# Patient Record
Sex: FEMALE | Race: WHITE | Employment: OTHER | URBAN - NONMETROPOLITAN AREA
[De-identification: names, ages, dates, MRNs, and addresses within clinical notes are randomized per-mention and may not be internally consistent; named-entity substitution may affect disease eponyms.]

---

## 2018-05-25 ENCOUNTER — TELEPHONE (OUTPATIENT)
Dept: AUDIOLOGY | Age: 73
End: 2018-05-25

## 2018-06-15 ENCOUNTER — HOSPITAL ENCOUNTER (OUTPATIENT)
Dept: AUDIOLOGY | Age: 73
Discharge: HOME | End: 2018-06-15
Payer: COMMERCIAL

## 2018-06-15 PROCEDURE — 92557 COMPREHENSIVE HEARING TEST: CPT | Performed by: AUDIOLOGIST

## 2018-06-28 ENCOUNTER — TELEPHONE (OUTPATIENT)
Dept: AUDIOLOGY | Age: 73
End: 2018-06-28

## 2018-06-28 NOTE — TELEPHONE ENCOUNTER
Received [place] [employer] prior authorization for hearing aids. Hearing aids ordered today. Patient needs appointment for new hearing aid fitting.

## 2018-07-27 ENCOUNTER — HOSPITAL ENCOUNTER (OUTPATIENT)
Dept: AUDIOLOGY | Age: 73
Discharge: HOME | End: 2018-07-27
Payer: MEDICAID

## 2018-07-27 PROCEDURE — V5261 HEARING AID, DIGIT, BIN, BTE: HCPCS | Performed by: AUDIOLOGIST

## 2018-07-27 PROCEDURE — V5160 DISPENSING FEE BINAURAL: HCPCS | Performed by: AUDIOLOGIST

## 2018-07-27 NOTE — PROGRESS NOTES
ACCOUNT #: [de-identified]  DAMON#: [id number]    DIAGNOSIS: Sensorineural hearing loss of both ears. NEW HEARING AID FITTING: Dispensed [employer] V30-P BTE hearing aids for both ears. Explained care, use and insertion. Programmed. Two week check scheduled for 8/10/18.

## 2018-08-24 ENCOUNTER — HOSPITAL ENCOUNTER (OUTPATIENT)
Dept: AUDIOLOGY | Age: 73
Discharge: HOME | End: 2018-08-24
Payer: MEDICAID

## 2018-08-24 PROCEDURE — V5266 BATTERY FOR HEARING DEVICE: HCPCS | Performed by: AUDIOLOGIST

## 2018-08-24 NOTE — PROGRESS NOTES
ACCOUNT #: [de-identified]    DIAGNOSIS:  Sensorineural hearing loss of both ears. TWO WEEK CHECK/AIDED AUDIO: The patient is doing well with the new hearing aids, except for some soreness in the helix area and tragus. Trimmed and filed both earmolds and patient immediately noted comfort. Did not complete aided testing in the soundfield due to zamudio being too small for her wheelchair. Sent follow up letter to the patient. Will see Luigi Chan annually, or sooner if problems arise. Dispensed 8 hearing aid batteries (billed to [facility]). Patient is aware that she needs to return within 90 days for earmold remake if they continue to cause soreness.

## 2018-09-21 ENCOUNTER — HOSPITAL ENCOUNTER (OUTPATIENT)
Dept: AUDIOLOGY | Age: 73
Discharge: HOME | End: 2018-09-21
Payer: MEDICAID

## 2018-09-21 PROCEDURE — V5266 BATTERY FOR HEARING DEVICE: HCPCS | Performed by: AUDIOLOGIST

## 2018-11-20 ENCOUNTER — HOSPITAL ENCOUNTER (OUTPATIENT)
Dept: AUDIOLOGY | Age: 73
Discharge: HOME | End: 2018-11-20
Payer: MEDICAID

## 2018-11-20 ENCOUNTER — TELEPHONE (OUTPATIENT)
Dept: AUDIOLOGY | Age: 73
End: 2018-11-20

## 2018-11-20 PROCEDURE — V5266 BATTERY FOR HEARING DEVICE: HCPCS

## 2018-11-20 NOTE — TELEPHONE ENCOUNTER
ACCOUNT #: [de-identified]    DIAGNOSIS:  Sensorineural hearing loss of both ears. Dispensed 8 hearing aid batteries (billed to [place]).

## 2019-10-04 ENCOUNTER — HOSPITAL ENCOUNTER (OUTPATIENT)
Dept: AUDIOLOGY | Age: 74
Discharge: HOME | End: 2019-10-04

## 2019-10-04 PROCEDURE — 9990000010 HC NO CHARGE VISIT: Performed by: AUDIOLOGIST

## 2020-06-09 ENCOUNTER — TELEPHONE (OUTPATIENT)
Dept: AUDIOLOGY | Age: 75
End: 2020-06-09

## 2020-10-29 ENCOUNTER — HOSPITAL ENCOUNTER (OUTPATIENT)
Dept: AUDIOLOGY | Age: 75
Discharge: HOME | End: 2020-10-29
Payer: MEDICAID

## 2020-10-29 PROCEDURE — 9990000010 HC NO CHARGE VISIT: Performed by: AUDIOLOGIST

## 2020-10-29 NOTE — PROGRESS NOTES
ANNUAL HEARING AID CHECK: Otoscopy was WNL for the left ear and revealed a significant piece of hard cerumen in the [facility] of the right ear. Listening check of hearing aids revealed normal output. Replaced earmold tubing on both hearing aids- the tubing was very brittle and pulling the earmolds out. Recommended debrox drops/cerumen removal for the left ear. Patient's daughter will have one of her sister's clean out the left ear. Suggested that they request a physician's order to have Mariluz's hearing tested. Scheduled six month tubing change for 4/29/2021.

## 2021-04-09 ENCOUNTER — HOSPITAL ENCOUNTER (OUTPATIENT)
Dept: AUDIOLOGY | Age: 76
Discharge: HOME | End: 2021-04-09
Payer: MEDICARE

## 2021-04-09 PROCEDURE — 92557 COMPREHENSIVE HEARING TEST: CPT | Performed by: AUDIOLOGIST

## 2021-04-09 PROCEDURE — 92592 HC HEARING AID CHECK, ONE EAR: CPT | Performed by: AUDIOLOGIST

## 2021-04-27 ENCOUNTER — HOSPITAL ENCOUNTER (OUTPATIENT)
Dept: AUDIOLOGY | Age: 76
Discharge: HOME | End: 2021-04-27

## 2021-04-27 PROCEDURE — 9990000010 HC NO CHARGE VISIT: Performed by: AUDIOLOGIST

## 2023-02-08 ENCOUNTER — OUTSIDE SERVICES (OUTPATIENT)
Dept: INTERNAL MEDICINE CLINIC | Age: 78
End: 2023-02-08

## 2023-02-08 DIAGNOSIS — N18.9 CHRONIC KIDNEY DISEASE, UNSPECIFIED CKD STAGE: ICD-10-CM

## 2023-02-08 DIAGNOSIS — G47.00 INSOMNIA, UNSPECIFIED TYPE: ICD-10-CM

## 2023-02-08 DIAGNOSIS — E03.9 HYPOTHYROIDISM, ADULT: Primary | ICD-10-CM

## 2023-02-08 DIAGNOSIS — F03.90 DEMENTIA, UNSPECIFIED DEMENTIA SEVERITY, UNSPECIFIED DEMENTIA TYPE, UNSPECIFIED WHETHER BEHAVIORAL, PSYCHOTIC, OR MOOD DISTURBANCE OR ANXIETY (HCC): ICD-10-CM

## 2023-02-08 DIAGNOSIS — L03.116 CELLULITIS OF LEFT FOOT: Primary | ICD-10-CM

## 2023-02-08 DIAGNOSIS — L03.90 CELLULITIS, UNSPECIFIED CELLULITIS SITE: ICD-10-CM

## 2023-02-08 DIAGNOSIS — E03.9 HYPOTHYROIDISM, ADULT: ICD-10-CM

## 2023-02-08 DIAGNOSIS — E11.49 OTHER DIABETIC NEUROLOGICAL COMPLICATION ASSOCIATED WITH TYPE 2 DIABETES MELLITUS (HCC): ICD-10-CM

## 2023-02-08 DIAGNOSIS — E11.40 TYPE 2 DIABETES MELLITUS WITH DIABETIC NEUROPATHY, UNSPECIFIED WHETHER LONG TERM INSULIN USE (HCC): ICD-10-CM

## 2023-02-08 DIAGNOSIS — F32.A DEPRESSION, UNSPECIFIED DEPRESSION TYPE: ICD-10-CM

## 2023-02-08 RX ORDER — DULAGLUTIDE 1.5 MG/.5ML
1.5 INJECTION, SOLUTION SUBCUTANEOUS WEEKLY
COMMUNITY

## 2023-02-08 RX ORDER — LAMOTRIGINE 100 MG/1
100 TABLET ORAL 2 TIMES DAILY
COMMUNITY

## 2023-02-08 RX ORDER — DULOXETIN HYDROCHLORIDE 30 MG/1
30 CAPSULE, DELAYED RELEASE ORAL 2 TIMES DAILY
COMMUNITY

## 2023-02-08 RX ORDER — ACETAMINOPHEN 325 MG/1
650 TABLET ORAL EVERY 6 HOURS PRN
COMMUNITY

## 2023-02-08 RX ORDER — AZELASTINE HCL 205.5 UG/1
2 SPRAY NASAL DAILY
COMMUNITY

## 2023-02-08 RX ORDER — LORATADINE 10 MG/1
10 CAPSULE, LIQUID FILLED ORAL DAILY
COMMUNITY

## 2023-02-08 RX ORDER — PANTOPRAZOLE SODIUM 40 MG/1
40 TABLET, DELAYED RELEASE ORAL DAILY
COMMUNITY

## 2023-02-08 RX ORDER — BISACODYL 10 MG
10 SUPPOSITORY, RECTAL RECTAL DAILY PRN
COMMUNITY

## 2023-02-08 RX ORDER — TRAZODONE HYDROCHLORIDE 100 MG/1
100 TABLET ORAL NIGHTLY
COMMUNITY

## 2023-02-08 RX ORDER — ACETAMINOPHEN 500 MG
500 TABLET ORAL 2 TIMES DAILY
COMMUNITY

## 2023-02-08 RX ORDER — CHOLECALCIFEROL (VITAMIN D3) 1250 MCG
CAPSULE ORAL WEEKLY
COMMUNITY

## 2023-02-08 RX ORDER — BENZONATATE 100 MG/1
100 CAPSULE ORAL 3 TIMES DAILY PRN
COMMUNITY

## 2023-02-08 RX ORDER — DIPHENHYDRAMINE HCL 25 MG
25 TABLET ORAL EVERY 8 HOURS PRN
COMMUNITY

## 2023-02-08 RX ORDER — NYSTATIN 100000 [USP'U]/G
POWDER TOPICAL 3 TIMES DAILY PRN
COMMUNITY

## 2023-02-08 RX ORDER — BUSPIRONE HYDROCHLORIDE 15 MG/1
15 TABLET ORAL 3 TIMES DAILY
COMMUNITY

## 2023-02-08 RX ORDER — POLYETHYLENE GLYCOL 3350 17 G/17G
17 POWDER, FOR SOLUTION ORAL DAILY PRN
COMMUNITY

## 2023-02-12 PROBLEM — G47.00 INSOMNIA: Status: ACTIVE | Noted: 2023-02-12

## 2023-02-12 PROBLEM — F03.90 DEMENTIA (HCC): Status: ACTIVE | Noted: 2023-02-12

## 2023-02-12 PROBLEM — F32.A DEPRESSION: Status: ACTIVE | Noted: 2023-02-12

## 2023-02-12 NOTE — PROGRESS NOTES
Date of Admission: 2/3/2023  Date of Encounter: 2/8/2023  Patient:  Michael Combs  YOB: 1945      Chief Complaint: Admission after treatment in the hospital for cellulitis    History of Present Illness:  She was treated in the hospital for cellulitis in the lower extremity. Appears to be better now with antibiotics. She denies fever or chills. Denies pain in her leg. Leg still erythematous, but she says it is much better than before. Past Medical History: Hypothyroidism, depression, type 2 diabetes  Past Family History: Noncontributory  Social History: Non-smoker, nondrinker at this time    Review of Systems:  Constitutional: Denies fever, chills  Cardiac: Denies chest pain or palpitations  Except as dictated above, all other systems reviewed and are negative     Physical Exam  Vitals: Blood pressure 108/56. Pulse 78. Respirations 16. Temperature 98.3  Constitutional: No acute distress  Eyes: No lid lag or proptosis. PERRLA  HENT: External ears normal. No lesions on the lips  Respiratory: Good air entry bilaterally. No wheezing or crackles  Cardiovascular: Normal S1-S2. Lower extremity edema  Gastrointestinal: No visible veins or masses. Good bowel sounds  Skin: erythema and swelling in the lower extremity   Neurologic: Cranial nerves grossly intact. Sensation grossly intact  Psychiatric: Alert and oriented. Normal Affect. Assessments   Diagnosis Orders   1. Hypothyroidism, adult        2. Type 2 diabetes mellitus with diabetic neuropathy, unspecified whether long term insulin use ([address]        3. Insomnia, unspecified type        4. Depression, unspecified depression type        5. Dementia, unspecified dementia severity, unspecified dementia type, unspecified whether behavioral, psychotic, or mood disturbance or anxiety ([address]        6. Cellulitis, unspecified cellulitis site            Plan  We will continue current medications unless otherwise noted.   I will be notified of any changes to the patient's condition. This note has been created using the [facility] health record, and dictated in part by [employer] dictation system. Despite the documenting physician's best efforts, there may be errors in spelling, grammar or syntax.

## 2023-02-17 ENCOUNTER — OUTSIDE SERVICES (OUTPATIENT)
Dept: FAMILY MEDICINE CLINIC | Age: 78
End: 2023-02-17
Payer: MEDICARE

## 2023-02-17 DIAGNOSIS — E03.9 HYPOTHYROIDISM, UNSPECIFIED TYPE: ICD-10-CM

## 2023-02-17 DIAGNOSIS — L98.1 DERMATITIS FACTITIA: ICD-10-CM

## 2023-02-17 DIAGNOSIS — F03.90 DEMENTIA, UNSPECIFIED DEMENTIA SEVERITY, UNSPECIFIED DEMENTIA TYPE, UNSPECIFIED WHETHER BEHAVIORAL, PSYCHOTIC, OR MOOD DISTURBANCE OR ANXIETY (HCC): ICD-10-CM

## 2023-02-17 DIAGNOSIS — F32.9 MAJOR DEPRESSIVE DISORDER, REMISSION STATUS UNSPECIFIED, UNSPECIFIED WHETHER RECURRENT: ICD-10-CM

## 2023-02-17 DIAGNOSIS — L03.116 CELLULITIS OF LEFT LEG: Primary | ICD-10-CM

## 2023-02-17 DIAGNOSIS — Z86.14 HISTORY OF MRSA INFECTION: ICD-10-CM

## 2023-02-17 DIAGNOSIS — G47.00 INSOMNIA, UNSPECIFIED TYPE: ICD-10-CM

## 2023-02-17 DIAGNOSIS — E55.9 VITAMIN D DEFICIENCY: ICD-10-CM

## 2023-02-17 DIAGNOSIS — N18.9 CHRONIC KIDNEY DISEASE, UNSPECIFIED CKD STAGE: ICD-10-CM

## 2023-02-17 DIAGNOSIS — E78.5 HYPERLIPIDEMIA, UNSPECIFIED HYPERLIPIDEMIA TYPE: ICD-10-CM

## 2023-02-17 PROCEDURE — 99309 SBSQ NF CARE MODERATE MDM 30: CPT | Performed by: FAMILY MEDICINE

## 2023-03-09 LAB
ANION GAP SERPL CALCULATED.3IONS-SCNC: 4.8 MMOL/L
BUN BLDV-MCNC: 20 MG/DL (ref 7–17)
CALCIUM SERPL-MCNC: 8.6 MG/DL (ref 8.4–10.2)
CHLORIDE BLD-SCNC: 100 MMOL/L (ref 98–120)
CO2: 39 MMOL/L (ref 22–31)
CREAT SERPL-MCNC: 1 MG/DL (ref 0.5–1)
GFR CALCULATED: 57.1
GLUCOSE: 129 MG/DL (ref 65–105)
POTASSIUM SERPL-SCNC: 3.8 MMOL/L (ref 3.6–5)
SODIUM BLD-SCNC: 140 MMOL/L (ref 135–145)

## 2023-03-14 ENCOUNTER — OUTSIDE SERVICES (OUTPATIENT)
Dept: INTERNAL MEDICINE | Age: 78
End: 2023-03-14
Payer: MEDICARE

## 2023-03-14 DIAGNOSIS — G47.00 INSOMNIA, UNSPECIFIED TYPE: ICD-10-CM

## 2023-03-14 DIAGNOSIS — F03.90 DEMENTIA, UNSPECIFIED DEMENTIA SEVERITY, UNSPECIFIED DEMENTIA TYPE, UNSPECIFIED WHETHER BEHAVIORAL, PSYCHOTIC, OR MOOD DISTURBANCE OR ANXIETY (HCC): ICD-10-CM

## 2023-03-14 DIAGNOSIS — E11.40 TYPE 2 DIABETES MELLITUS WITH DIABETIC NEUROPATHY, UNSPECIFIED WHETHER LONG TERM INSULIN USE (HCC): ICD-10-CM

## 2023-03-14 DIAGNOSIS — F32.A DEPRESSION, UNSPECIFIED DEPRESSION TYPE: ICD-10-CM

## 2023-03-14 DIAGNOSIS — E03.9 HYPOTHYROIDISM, ADULT: Primary | ICD-10-CM

## 2023-03-14 DIAGNOSIS — L03.90 CELLULITIS, UNSPECIFIED CELLULITIS SITE: ICD-10-CM

## 2023-03-14 PROCEDURE — 99308 SBSQ NF CARE LOW MDM 20: CPT | Performed by: NURSE PRACTITIONER

## 2023-03-15 NOTE — PROGRESS NOTES
[facility]  Date of Service: 03/14/23  Sherwin Stearns  Date of Birth 1945  Code Status----FULL CODE       HPI:  This is a female patient of Dr. David Smith in [facility], who presents on transfer from the [facility]. Patient was recently hospitalized due to cellulitis around the left knee. She has a dermatitis type tissue diagnosis and is a chronic quote . Treated with IV antibiotics with improvement. Chronic history reviewed. Her infection seemed to have improved. She still has a Band-Aid over one of the lesions on the left knee. She endorses chronic peripheral edema. Currently, the cellulitis is improved. She is reportedly somewhat limited on weight bearing due to right knee pain which is also chronic. In the interval  Patient being seen for routine monthly rounds. She was [place] [facility] [place] [facility] at [facility] where she was admitted 1/27/2023 through 2/3/2023 for cellulitis of lower extremity. Resident legs significantly improved. Patient was previously at [address] she wants to get back home but understands that her family does not feel that is safe for her. Her daughter does work at [facility] in patient is happy that she gets to see her more frequently. She denies any shortness of breath no chest discomfort. Does ambulate with 1 assist and a rolling walker per the nurses aide. No recent falls while at [facility]. States she has a good appetite. Denies any problems with bowel or bladder. Vitals have been stable. Nursing staff deny any acute nursing service issues.            Immunization History   Administered Date(s) Administered    COVID-19, MODERNA BLUE border, Primary or Immunocompromised, (age 12y+), IM, 100 mcg/0.5mL 01/30/2021, 02/09/2021    Influenza Virus Vaccine 10/30/2008    Influenza, FLUAD, (age 72 y+), Adjuvanted, 0.5mL 02/10/2023    Influenza, FLUARIX, FLULAVAL, FLUZONE (age 10 mo+) AND AFLURIA, (age 1 y+), PF, 0.5mL 11/13/2017, 11/29/2018, 10/24/2019, 09/17/2020    Pneumococcal Conjugate 13-valent [place]) 11/03/2015    Tdap (Boostrix, Adacel) 10/02/2009       Allergies   Allergen Reactions    Dust Mite Extract     Molds & Smuts     Pollen Extract     Aspirin     Adhesive Tape        Past Medical History:   Diagnosis Date    Acid reflux     Asthma     Cellulitis of left foot     Chronic kidney disease, unspecified     Diabetes mellitus ([facility]     Diabetic neuropathy ([facility]     Hyperlipidemia     Hypothyroidism, adult     Thyroid disease        No past surgical history on file.     Social History     Socioeconomic History    Marital status: Single     Spouse name: Not on file    Number of children: Not on file    Years of education: Not on file    Highest education level: Not on file   Occupational History    Not on file   Tobacco Use    Smoking status: Not on file    Smokeless tobacco: Not on file   Substance and Sexual Activity    Alcohol use: Not on file    Drug use: Not on file    Sexual activity: Not on file   Other Topics Concern    Not on file   Social History Narrative    Not on file     Social Determinants of Health     Financial Resource Strain: Not on file   Food Insecurity: Not on file   Transportation Needs: Not on file   Physical Activity: Not on file   Stress: Not on file   Social Connections: Not on file   Intimate Partner Violence: Not on file   Housing Stability: Not on file       Current Outpatient Medications on File Prior to Visit   Medication Sig Dispense Refill    linagliptin (TRADJENTA) 5 MG tablet Take 5 mg by mouth daily      ASPERCREME LIDOCAINE EX Apply topically every 8 hours as needed Bilateral knees      azelastine HCl 0.15 % SOLN 2 sprays by Nasal route daily      benzonatate (TESSALON) 100 MG capsule Take 100 mg by mouth 3 times daily as needed for Cough      busPIRone (BUSPAR) 15 MG tablet Take 15 mg by mouth 3 times daily      carbamide peroxide (DEBROX) 6.5 % otic solution 3 drops daily as needed      diphenhydrAMINE (BENADRYL) 25 MG tablet Take 25 mg by mouth every 8 hours as needed for Allergies      bisacodyl (DULCOLAX) 10 MG suppository Place 10 mg rectally daily as needed for Constipation      DULoxetine (CYMBALTA) 30 MG extended release capsule Take 30 mg by mouth 2 times daily      Cholecalciferol (VITAMIN D3) 1.25 MG (81286 UT) CAPS Take by mouth once a week On Friday      lamoTRIgine (LAMICTAL) 100 MG tablet Take 100 mg by mouth 2 times daily      loratadine (CLARITIN) 10 MG capsule Take 10 mg by mouth daily      magnesium hydroxide (MILK OF MAGNESIA) 400 MG/5ML suspension Take 30 mLs by mouth daily as needed for Constipation      pantoprazole (PROTONIX) 40 MG tablet Take 40 mg by mouth daily      polyethylene glycol (GLYCOLAX) 17 g packet Take 17 g by mouth daily as needed for Constipation      traZODone (DESYREL) 100 MG tablet Take 100 mg by mouth nightly      dulaglutide (TRULICITY) 1.5 YR/8.7OL SC injection Inject 1.5 mg into the skin once a week Fridays      acetaminophen (TYLENOL) 500 MG tablet Take 500 mg by mouth 2 times daily      glucagon, rDNA, 1 MG injection as needed for Low blood sugar      Dextrose, Diabetic Use, (GLUCOSE) 15 GM/33GM GEL Take by mouth as needed      docusate sodium (COLACE) 100 MG capsule Take 1 capsule by mouth 2 times daily. 100 capsule 0    furosemide (LASIX) 20 MG tablet Take 20 mg by mouth 2 times daily. potassium chloride (MICRO-K) 10 MEQ CR capsule Take 10 mEq by mouth daily      levothyroxine (SYNTHROID) 150 MCG tablet Take 150 mcg by mouth Daily. metformin (GLUCOPHAGE) 500 MG tablet Take 500 mg by mouth 2 times daily (with meals). ferrous sulfate 325 (65 FE) MG EC tablet Take 325 mg by mouth 3 times daily (with meals). atorvastatin (LIPITOR) 10 MG tablet Take 10 mg by mouth daily.       hydrOXYzine (ATARAX) 25 MG tablet Take 25 mg by mouth every 8 hours as needed      fluticasone-salmeterol (ADVAIR) 250-50 MCG/DOSE AEPB Inhale 1 puff into the lungs 2 times daily. gabapentin (NEURONTIN) 100 MG capsule Take 100 mg by mouth 4 times daily. baclofen (LIORESAL) 5 mg TABS Take 10 mg by mouth daily       No current facility-administered medications on file prior to visit. Review of Systems   Constitutional:  Positive for activity change. Negative for appetite change, chills, fatigue, fever and unexpected weight change. HENT:  Negative for congestion, dental problem, ear discharge, ear pain, facial swelling, hearing loss, postnasal drip, rhinorrhea, sinus pressure, sore throat and trouble swallowing. Eyes:  Negative for pain and visual disturbance. Respiratory:  Negative for cough, chest tightness, shortness of breath and wheezing. Cardiovascular:  Positive for leg swelling (Chronic, stable). Negative for chest pain and palpitations. Gastrointestinal:  Negative for abdominal pain, blood in stool, constipation, diarrhea, nausea and vomiting. Endocrine: Negative for cold intolerance, heat intolerance and polyuria. Genitourinary:  Negative for difficulty urinating. Musculoskeletal:  Positive for arthralgias (Stable). Negative for gait problem, myalgias, neck pain and neck stiffness. Skin:  Negative for color change, rash and wound. Neurological:  Positive for weakness. Negative for dizziness, tremors, seizures, light-headedness, numbness and headaches. Forgetfulness   Psychiatric/Behavioral:  Negative for confusion and hallucinations. The patient is not nervous/anxious. Physical Exam  Vitals and nursing note reviewed. Constitutional:       General: She is not in acute distress. Appearance: Normal appearance. She is well-developed. She is not diaphoretic. HENT:      Head: Normocephalic and atraumatic. Right Ear: External ear normal.      Left Ear: External ear normal.   Eyes:      General:         Right eye: No discharge. Left eye: No discharge.    Neck: Trachea: No tracheal deviation. Cardiovascular:      Rate and Rhythm: Normal rate and regular rhythm. Heart sounds: Normal heart sounds. No murmur heard. No friction rub. No gallop. Pulmonary:      Effort: Pulmonary effort is normal. No respiratory distress. Breath sounds: Normal breath sounds. No stridor. No wheezing, rhonchi or rales. Chest:      Chest wall: No tenderness. Abdominal:      General: Bowel sounds are normal. There is no distension. Palpations: Abdomen is soft. Tenderness: There is no abdominal tenderness. Musculoskeletal:         General: No swelling. Right lower leg: Edema present. Left lower leg: Edema (+1 edema bilateral lower extremities, chronic discoloration mild erythremia posterior to bilateral left greater than right) present. Skin:     General: Skin is warm and dry. Capillary Refill: Capillary refill takes less than 2 seconds. Coloration: Skin is not jaundiced or pale. Findings: No rash. Neurological:      General: No focal deficit present. Mental Status: She is alert and oriented to person, place, and time. Cranial Nerves: No cranial nerve deficit. Sensory: No sensory deficit. Coordination: Coordination normal.   Psychiatric:         Mood and Affect: Mood normal.         Behavior: Behavior normal.         Thought Content: Thought content normal.         Judgment: Judgment normal.       Vital signs: Temperature: 97.3 °F, blood pressure 131/73, pulse 73, respirations 18, SPO2 98% on room air, weight 244.6 pounds    ASSESSMENT/PLAN:  Cellulitis of left lower extremity. She likely self-picked some lesions around the knee starting the infection. Blood cultures were negative. Symptoms resolved quickly with IV antibiotics and are close to being fully healed. She has her fingernails cut short.  She is aware of her problem with picking holes in her skin and is actively trying not to damage her skin with repetitive scratching. Hypothyroidism. No updated labs available. Plan to continue her Synthroid dosing at 150 micrograms per day. Follow up with serial labs if she becomes a long term patient. History of dementia. We do not have any current history. She is on no active medications for dementia but is being treated actively for anxiety and depression. History of insomnia. Patient has hydroxyzine and Benadryl as needed. No current complaints for every issue sleeping. History of major depression. She is currently on duloxetine as well as lamotrigine. No current concerns. No concerns for behavior issues. Will observe on her chronic medication dosing as above. History of GERD. Fairly quiescent at present on Protonix once daily. History of asthma. She is on an inhaler fluticasone salmeterol once daily fairly quiescent. Currently I hear more crackles which gives consideration for pulmonary fibrosis. She denies ever being a smoker. CKD. No recent labs. Will follow this serially and avoid nephrotoxins when able. Diabetes with neuropathy. Patient is currently on Tradjenta and Trulicity. No concerns for hypoglycemia. Will likely update labs with follow up is she is long term. Hyperlipidemia. Plan to continue atorvastatin 10 milligrams a day with regular checks. Dermatitis factitial. She likely has this as a source for her recent knee infection. Encouraged keeping her nails short and keeping the arms covered and do what she can to not excoriate her skin. History of incontinence and urgency. No active medication treatment at this time. History of MRSA. No evidence of recurrent infections. Current infection did not involve MRSA. History of osteoarthritis multiple joints. Currently, she is getting right knee pain that is limiting her ambulation. Offered an injection trial out here at the facility if she is struggling with persistent pain. Prior history of pulmonary embolism. Noted in her records.  No significant anticoagulation. She did have updated DVT scanning of the legs which was negative during this last admission. Vitamin D deficiency. Implied from her vitamin D replacement at 50,000 units weekly. Plan to continue same. Question of a chronic cough. I see that she is on benzonatate 100 milligrams three times a day likely for chronic cough. She has some inspiratory crackles in the posterior bases suspicious for pulmonary fibrosis. Currently, without any hypoxia but her room air oxygen saturation is at the lower end of normal at 92%. Observation at present. Remainder of chronic health conditions stableand unchanged  Plan as noted above- will follow up for routine monthly rounds. They will call sooner with any concerns prior.      Electronically signed by RONAK Jennings CNP on 3/14/2023 at 9:55 PM

## 2023-03-15 NOTE — PROGRESS NOTES
[facility]  Date of Service:  2/17/2023  Kamilah Cardenas  Date of Birth 1945  MRN: <[id number]>  Code Status----FULL CODE    Admission. HPI:  This is a female patient of Dr. Todd Erazo in [facility], who presents on transfer from the [facility]. Patient was recently hospitalized due to cellulitis around the left knee. She has a dermatitis type tissue diagnosis and is a chronic quote . Treated with IV antibiotics with improvement. Chronic history reviewed. Her infection seemed to have improved. She still has a Band-Aid over one of the lesions on the left knee. She endorses chronic peripheral edema. Currently, the cellulitis is improved. She is reportedly somewhat limited on weight bearing due to right knee pain which is also chronic. Immunization History   Administered Date(s) Administered    COVID-19, MODERNA BLUE border, Primary or Immunocompromised, (age 12y+), IM, 100 mcg/0.5mL 01/30/2021, 02/09/2021    Influenza Virus Vaccine 10/30/2008    Influenza, FLUAD, (age 72 y+), Adjuvanted, 0.5mL 02/10/2023    Influenza, FLUARIX, FLULAVAL, FLUZONE (age 10 mo+) AND AFLURIA, (age 1 y+), PF, 0.5mL 11/13/2017, 11/29/2018, 10/24/2019, 09/17/2020    Pneumococcal Conjugate 13-valent [place] Innocent) 11/03/2015    Tdap (Boostrix, Adacel) 10/02/2009       Allergies   Allergen Reactions    Dust Mite Extract     Molds & Smuts     Pollen Extract     Aspirin     Adhesive Tape        Past Medical History:   Diagnosis Date    Acid reflux     Asthma     Cellulitis of left foot     Chronic kidney disease, unspecified     Diabetes mellitus ([address]     Diabetic neuropathy ([address]     Hyperlipidemia     Hypothyroidism, adult     Thyroid disease        No past surgical history on file.     Social History     Socioeconomic History    Marital status: Single     Spouse name: Not on file    Number of children: Not on file    Years of education: Not on file    Highest education level: Not on file Occupational History    Not on file   Tobacco Use    Smoking status: Not on file    Smokeless tobacco: Not on file   Substance and Sexual Activity    Alcohol use: Not on file    Drug use: Not on file    Sexual activity: Not on file   Other Topics Concern    Not on file   Social History Narrative    Not on file     Social Determinants of Health     Financial Resource Strain: Not on file   Food Insecurity: Not on file   Transportation Needs: Not on file   Physical Activity: Not on file   Stress: Not on file   Social Connections: Not on file   Intimate Partner Violence: Not on file   Housing Stability: Not on file       Current Outpatient Medications   Medication Sig Dispense Refill    linagliptin (TRADJENTA) 5 MG tablet Take 5 mg by mouth daily      ASPERCREME LIDOCAINE EX Apply topically every 8 hours as needed Bilateral knees      azelastine HCl 0.15 % SOLN 2 sprays by Nasal route daily      benzonatate (TESSALON) 100 MG capsule Take 100 mg by mouth 3 times daily as needed for Cough      busPIRone (BUSPAR) 15 MG tablet Take 15 mg by mouth 3 times daily      carbamide peroxide (DEBROX) 6.5 % otic solution 3 drops daily as needed      diphenhydrAMINE (BENADRYL) 25 MG tablet Take 25 mg by mouth every 8 hours as needed for Allergies      bisacodyl (DULCOLAX) 10 MG suppository Place 10 mg rectally daily as needed for Constipation      DULoxetine (CYMBALTA) 30 MG extended release capsule Take 30 mg by mouth 2 times daily      Cholecalciferol (VITAMIN D3) 1.25 MG (62086 UT) CAPS Take by mouth once a week On Friday      lamoTRIgine (LAMICTAL) 100 MG tablet Take 100 mg by mouth 2 times daily      loratadine (CLARITIN) 10 MG capsule Take 10 mg by mouth daily      magnesium hydroxide (MILK OF MAGNESIA) 400 MG/5ML suspension Take 30 mLs by mouth daily as needed for Constipation      pantoprazole (PROTONIX) 40 MG tablet Take 40 mg by mouth daily      polyethylene glycol (GLYCOLAX) 17 g packet Take 17 g by mouth daily as needed for Constipation      traZODone (DESYREL) 100 MG tablet Take 100 mg by mouth nightly      dulaglutide (TRULICITY) 1.5 WL/8.3WY SC injection Inject 1.5 mg into the skin once a week Fridays      acetaminophen (TYLENOL) 500 MG tablet Take 500 mg by mouth 2 times daily      glucagon, rDNA, 1 MG injection as needed for Low blood sugar      Dextrose, Diabetic Use, (GLUCOSE) 15 GM/33GM GEL Take by mouth as needed      docusate sodium (COLACE) 100 MG capsule Take 1 capsule by mouth 2 times daily. 100 capsule 0    furosemide (LASIX) 20 MG tablet Take 20 mg by mouth 2 times daily. potassium chloride (MICRO-K) 10 MEQ CR capsule Take 10 mEq by mouth daily      levothyroxine (SYNTHROID) 150 MCG tablet Take 150 mcg by mouth Daily. metformin (GLUCOPHAGE) 500 MG tablet Take 500 mg by mouth 2 times daily (with meals). ferrous sulfate 325 (65 FE) MG EC tablet Take 325 mg by mouth 3 times daily (with meals). atorvastatin (LIPITOR) 10 MG tablet Take 10 mg by mouth daily. hydrOXYzine (ATARAX) 25 MG tablet Take 25 mg by mouth every 8 hours as needed      fluticasone-salmeterol (ADVAIR) 250-50 MCG/DOSE AEPB Inhale 1 puff into the lungs 2 times daily. gabapentin (NEURONTIN) 100 MG capsule Take 100 mg by mouth 4 times daily. baclofen (LIORESAL) 5 mg TABS Take 10 mg by mouth daily       No current facility-administered medications for this visit. REVIEW OF SYSTEMS:  Recent cellulitis much improved. Knee pain more on the right-side effects ambulation at present. Chronic obesity compromising her ambulation as well. Denies any significant depression. Unaware of her blood sugar control. Some mild incontinence and urgency of urination. Review systems otherwise negative. PHYSICAL EXAM:  Vital Signs:  Weight 255.7 pounds (she appears to be up about 6-7 pounds from admission weight). Temperature 97.2. Pulse 75. Respiratory rate 18. Blood pressure 129/87. SPO2 92% on room air.     Constitutional: Pleasant, age appropriate female sitting in a wheelchair. She is well dressed and well groomed. No apparent distress. Cardiovascular:   Regular rate and rhythm. Pulmonary:   Lungs have some inspiratory and expiratory crackles heard in the posterior bases. She reports intermittent cough long term. Abdominal:  Obese. She had a couple skin lesions over the waistline that are now well healed. No palpable masses. Active bowel sounds. Musculoskeletal:      Lower extremities have 2 to 3+ edema. Skin:  Some warmth and erythema still over the lower anterior shin area but no erosion of the skin. No drainage. Neurological:  Alert. Oriented to person and place. She is a fairly good historian at present. No behavioral issues. ASSESSMENT/PLAN:  Cellulitis of left lower extremity. She likely self-picked some lesions around the knee starting the infection. Blood cultures were negative. Symptoms resolved quickly with IV antibiotics and are close to being fully healed. She has her fingernails cut short. She is aware of her problem with picking holes in her skin and is actively trying not to damage her skin with repetitive scratching. Hypothyroidism. No updated labs available. Plan to continue her Synthroid dosing at 150 micrograms per day. Follow up with serial labs if she becomes a long term patient. History of dementia. We do not have any current history. She is on no active medications for dementia but is being treated actively for anxiety and depression. History of insomnia. Patient has hydroxyzine and Benadryl as needed. No current complaints for every issue sleeping. History of major depression. She is currently on duloxetine as well as lamotrigine. No current concerns. No concerns for behavior issues. Will observe on her chronic medication dosing as above. History of GERD. Fairly quiescent at present on Protonix once daily. History of asthma.  She is on an inhaler fluticasone salmeterol once daily fairly quiescent. Currently I hear more crackles which gives consideration for pulmonary fibrosis. She denies ever being a smoker. CKD. No recent labs. Will follow this serially and avoid nephrotoxins when able. Diabetes with neuropathy. Patient is currently on Tradjenta and Trulicity. No concerns for hypoglycemia. Will likely update labs with follow up is she is long term. Hyperlipidemia. Plan to continue atorvastatin 10 milligrams a day with regular checks. Dermatitis factitial. She likely has this as a source for her recent knee infection. Encouraged keeping her nails short and keeping the arms covered and do what she can to not excoriate her skin. History of incontinence and urgency. No active medication treatment at this time. History of MRSA. No evidence of recurrent infections. Current infection did not involve MRSA. History of osteoarthritis multiple joints. Currently, she is getting right knee pain that is limiting her ambulation. Offered an injection trial out here at the facility if she is struggling with persistent pain. Prior history of pulmonary embolism. Noted in her records. No significant anticoagulation. She did have updated DVT scanning of the legs which was negative during this last admission. Vitamin D deficiency. Implied from her vitamin D replacement at 50,000 units weekly. Plan to continue same. Question of a chronic cough. I see that she is on benzonatate 100 milligrams three times a day likely for chronic cough. She has some inspiratory crackles in the posterior bases suspicious for pulmonary fibrosis. Currently, without any hypoxia but her room air oxygen saturation is at the lower end of normal at 92%. Observation at present. Remainder of chronic health conditions stable and unchanged. Plan as noted above. Will follow up for routine monthly rounds. They will call sooner with any concerns prior.          I, Sheila Cleaning, am personally transcribing for Alvaro Baxter MD 3/15/23 at 11:20 AM EDT. Kristyn Singh MD, personally performed the services described in this document as transcribed by the , and it is both accurate and complete.     Electronically signed by Alvaro Baxter MD on 3/16/23 at 2:25 PM EDT

## 2023-03-16 ASSESSMENT — ENCOUNTER SYMPTOMS
TROUBLE SWALLOWING: 0
SORE THROAT: 0
WHEEZING: 0
SHORTNESS OF BREATH: 0
CONSTIPATION: 0
FACIAL SWELLING: 0
SINUS PRESSURE: 0
RHINORRHEA: 0
NAUSEA: 0
COUGH: 0
BLOOD IN STOOL: 0
ABDOMINAL PAIN: 0
EYE PAIN: 0
VOMITING: 0
DIARRHEA: 0
CHEST TIGHTNESS: 0
COLOR CHANGE: 0

## 2023-04-18 ENCOUNTER — TELEPHONE (OUTPATIENT)
Dept: FAMILY MEDICINE CLINIC | Age: 78
End: 2023-04-18
Payer: MEDICARE

## 2023-04-18 DIAGNOSIS — E11.22 TYPE 2 DIABETES MELLITUS WITH ESRD (END-STAGE RENAL DISEASE) (HCC): ICD-10-CM

## 2023-04-18 DIAGNOSIS — M15.0 PRIMARY GENERALIZED HYPERTROPHIC OSTEOARTHROSIS: Primary | ICD-10-CM

## 2023-04-18 DIAGNOSIS — E13.42 DIABETIC POLYNEUROPATHY ASSOCIATED WITH OTHER SPECIFIED DIABETES MELLITUS (HCC): ICD-10-CM

## 2023-04-18 DIAGNOSIS — F02.84: ICD-10-CM

## 2023-04-18 DIAGNOSIS — G31.09: ICD-10-CM

## 2023-04-18 DIAGNOSIS — G30.1 DEMENTIA OF THE ALZHEIMER'S TYPE, WITH LATE ONSET, WITH DEPRESSED MOOD (HCC): ICD-10-CM

## 2023-04-18 DIAGNOSIS — N18.6 TYPE 2 DIABETES MELLITUS WITH ESRD (END-STAGE RENAL DISEASE) (HCC): ICD-10-CM

## 2023-04-18 DIAGNOSIS — I50.9 HEART FAILURE, UNSPECIFIED HF CHRONICITY, UNSPECIFIED HEART FAILURE TYPE (HCC): ICD-10-CM

## 2023-04-18 DIAGNOSIS — G47.00 INSOMNIA, UNSPECIFIED TYPE: ICD-10-CM

## 2023-04-18 DIAGNOSIS — F32.9 MAJOR DEPRESSIVE DISORDER WITH SINGLE EPISODE, REMISSION STATUS UNSPECIFIED: ICD-10-CM

## 2023-04-18 DIAGNOSIS — E03.9 PRIMARY HYPOTHYROIDISM: ICD-10-CM

## 2023-04-18 DIAGNOSIS — N18.4 CHRONIC KIDNEY DISEASE, STAGE IV (SEVERE) (HCC): ICD-10-CM

## 2023-04-18 DIAGNOSIS — F42.4 SELF-EXCORIATION DISORDER: ICD-10-CM

## 2023-04-18 DIAGNOSIS — F02.83 DEMENTIA OF THE ALZHEIMER'S TYPE, WITH LATE ONSET, WITH DEPRESSED MOOD (HCC): ICD-10-CM

## 2023-04-18 DIAGNOSIS — L98.1 DERMATITIS FACTITIA: ICD-10-CM

## 2023-04-18 PROCEDURE — G0180 MD CERTIFICATION HHA PATIENT: HCPCS | Performed by: FAMILY MEDICINE

## 2023-04-18 NOTE — TELEPHONE ENCOUNTER
Home health plan of care reviewed and certification completed on 04/18/23 on patient for service dates 04/06/23-06/04/23. Medications reviewed and verified.   Physician time spent on activities to coordinate services, documenting medical decision making, reviewing of reports, treatment plans and test results is 20 minutes

## 2023-11-21 ENCOUNTER — HOSPITAL ENCOUNTER (EMERGENCY)
Age: 78
Discharge: HOME | End: 2023-11-21
Attending: EMERGENCY MEDICINE
Payer: MEDICARE

## 2023-11-21 ENCOUNTER — APPOINTMENT (OUTPATIENT)
Dept: CT IMAGING | Age: 78
End: 2023-11-21
Payer: MEDICARE

## 2023-11-21 VITALS
TEMPERATURE: 97.6 F | WEIGHT: 285 LBS | DIASTOLIC BLOOD PRESSURE: 72 MMHG | OXYGEN SATURATION: 96 % | SYSTOLIC BLOOD PRESSURE: 143 MMHG | RESPIRATION RATE: 16 BRPM | HEART RATE: 80 BPM

## 2023-11-21 DIAGNOSIS — K62.5 HEMORRHAGE OF RECTUM AND ANUS: Primary | ICD-10-CM

## 2023-11-21 LAB
ALBUMIN SERPL-MCNC: 3.7 G/DL (ref 3.5–5.2)
ALBUMIN/GLOB SERPL: 1.3 {RATIO} (ref 1–2.5)
ALP SERPL-CCNC: 55 U/L (ref 35–104)
ALT SERPL-CCNC: 7 U/L (ref 5–33)
ANION GAP SERPL CALCULATED.3IONS-SCNC: 6 MMOL/L (ref 9–17)
AST SERPL-CCNC: 10 U/L
BASOPHILS # BLD: 0.03 K/UL (ref 0–0.2)
BASOPHILS NFR BLD: 1 % (ref 0–2)
BILIRUB SERPL-MCNC: 0.5 MG/DL (ref 0.3–1.2)
BUN SERPL-MCNC: 22 MG/DL (ref 8–23)
BUN/CREAT SERPL: 22 (ref 9–20)
CALCIUM SERPL-MCNC: 8.9 MG/DL (ref 8.6–10.4)
CHLORIDE SERPL-SCNC: 98 MMOL/L (ref 98–107)
CO2 SERPL-SCNC: 36 MMOL/L (ref 20–31)
CREAT SERPL-MCNC: 1 MG/DL (ref 0.5–0.9)
EOSINOPHIL # BLD: 0.3 K/UL (ref 0–0.44)
EOSINOPHILS RELATIVE PERCENT: 5 % (ref 1–4)
ERYTHROCYTE [DISTWIDTH] IN BLOOD BY AUTOMATED COUNT: 13.6 % (ref 11.8–14.4)
GFR SERPL CREATININE-BSD FRML MDRD: 58 ML/MIN/1.73M2
GLUCOSE SERPL-MCNC: 223 MG/DL (ref 70–99)
HCT VFR BLD AUTO: 41.6 % (ref 36.3–47.1)
HGB BLD-MCNC: 13.2 G/DL (ref 11.9–15.1)
IMM GRANULOCYTES # BLD AUTO: 0.04 K/UL (ref 0–0.3)
IMM GRANULOCYTES NFR BLD: 1 %
INR PPP: 1
LACTATE BLDV-SCNC: 2.2 MMOL/L (ref 0.5–2.2)
LIPASE SERPL-CCNC: 17 U/L (ref 13–60)
LYMPHOCYTES NFR BLD: 2.12 K/UL (ref 1.1–3.7)
LYMPHOCYTES RELATIVE PERCENT: 33 % (ref 24–43)
MCH RBC QN AUTO: 30.3 PG (ref 25.2–33.5)
MCHC RBC AUTO-ENTMCNC: 31.7 G/DL (ref 25.2–33.5)
MCV RBC AUTO: 95.6 FL (ref 82.6–102.9)
MONOCYTES NFR BLD: 0.51 K/UL (ref 0.1–1.2)
MONOCYTES NFR BLD: 8 % (ref 3–12)
NEUTROPHILS NFR BLD: 52 % (ref 36–65)
NEUTS SEG NFR BLD: 3.39 K/UL (ref 1.5–8.1)
NRBC BLD-RTO: 0 PER 100 WBC
PLATELET # BLD AUTO: 244 K/UL (ref 138–453)
PMV BLD AUTO: 9.2 FL (ref 8.1–13.5)
POTASSIUM SERPL-SCNC: 4 MMOL/L (ref 3.7–5.3)
PROT SERPL-MCNC: 6.5 G/DL (ref 6.4–8.3)
PROTHROMBIN TIME: 13 SEC (ref 11.5–14.2)
RBC # BLD AUTO: 4.35 M/UL (ref 3.95–5.11)
SODIUM SERPL-SCNC: 140 MMOL/L (ref 135–144)
TROPONIN I SERPL HS-MCNC: 19 NG/L (ref 0–14)
TROPONIN I SERPL HS-MCNC: 21 NG/L (ref 0–14)
WBC OTHER # BLD: 6.4 K/UL (ref 3.5–11.3)

## 2023-11-21 PROCEDURE — 84484 ASSAY OF TROPONIN QUANT: CPT

## 2023-11-21 PROCEDURE — 99285 EMERGENCY DEPT VISIT HI MDM: CPT

## 2023-11-21 PROCEDURE — 36415 COLL VENOUS BLD VENIPUNCTURE: CPT

## 2023-11-21 PROCEDURE — 80053 COMPREHEN METABOLIC PANEL: CPT

## 2023-11-21 PROCEDURE — 85025 COMPLETE CBC W/AUTO DIFF WBC: CPT

## 2023-11-21 PROCEDURE — 2709999900 CT ABDOMEN PELVIS W IV CONTRAST

## 2023-11-21 PROCEDURE — 83690 ASSAY OF LIPASE: CPT

## 2023-11-21 PROCEDURE — 6360000004 HC RX CONTRAST MEDICATION: Performed by: EMERGENCY MEDICINE

## 2023-11-21 PROCEDURE — 83605 ASSAY OF LACTIC ACID: CPT

## 2023-11-21 PROCEDURE — 85610 PROTHROMBIN TIME: CPT

## 2023-11-21 RX ADMIN — IOPAMIDOL 100 ML: 755 INJECTION, SOLUTION INTRAVENOUS at 15:29

## 2023-11-21 ASSESSMENT — ENCOUNTER SYMPTOMS
BLOOD IN STOOL: 1
NAUSEA: 0
ABDOMINAL PAIN: 1
VOMITING: 0
BACK PAIN: 0
DIARRHEA: 0
SHORTNESS OF BREATH: 0
EYE PAIN: 0
ANAL BLEEDING: 1
COUGH: 0
CONSTIPATION: 0

## 2023-11-21 ASSESSMENT — PAIN - FUNCTIONAL ASSESSMENT: PAIN_FUNCTIONAL_ASSESSMENT: NONE - DENIES PAIN

## 2023-11-21 ASSESSMENT — LIFESTYLE VARIABLES
HOW OFTEN DO YOU HAVE A DRINK CONTAINING ALCOHOL: NEVER
HOW MANY STANDARD DRINKS CONTAINING ALCOHOL DO YOU HAVE ON A TYPICAL DAY: PATIENT DOES NOT DRINK

## 2023-11-21 NOTE — ED TRIAGE NOTES
Sent from [facility]  Had yusuf blood in stool this morning with abdominal pain. On arrival pt is pain free.

## 2023-11-21 NOTE — DISCHARGE INSTRUCTIONS
Follow-up with general surgery next week if bleeding returns or gets worse return to the emergency department

## 2023-11-25 LAB
EKG ATRIAL RATE: 73 BPM
EKG P AXIS: 65 DEGREES
EKG P-R INTERVAL: 242 MS
EKG Q-T INTERVAL: 394 MS
EKG QRS DURATION: 84 MS
EKG QTC CALCULATION (BAZETT): 434 MS
EKG R AXIS: 99 DEGREES
EKG T AXIS: 68 DEGREES
EKG VENTRICULAR RATE: 73 BPM

## 2024-01-05 NOTE — ED PROVIDER NOTES
[address] [facility],First [address]  [facility] [address]  [phone]    In 1 week        DISCHARGE MEDICATIONS:  New Prescriptions    No medications on file       (Please note that portions of this note were completed with a voice recognition program.  Efforts were made to edit the dictations but occasionally words are mis-transcribed.)    Keon Rutherford MD,, MD, F.A.A.E.M.   Attending Emergency Physician                           Keon Rutherford MD  11/21/23 [id number]
(1) Oriented to own ability
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
## 2024-01-11 DIAGNOSIS — K21.9 GASTROESOPHAGEAL REFLUX DISEASE, UNSPECIFIED WHETHER ESOPHAGITIS PRESENT: ICD-10-CM

## 2024-01-11 DIAGNOSIS — I50.9 HEART FAILURE, UNSPECIFIED HF CHRONICITY, UNSPECIFIED HEART FAILURE TYPE (HCC): ICD-10-CM

## 2024-01-11 DIAGNOSIS — J45.909 ASTHMA, UNSPECIFIED ASTHMA SEVERITY, UNSPECIFIED WHETHER COMPLICATED, UNSPECIFIED WHETHER PERSISTENT: ICD-10-CM

## 2024-01-11 DIAGNOSIS — G31.84 MILD COGNITIVE IMPAIRMENT: ICD-10-CM

## 2024-01-11 DIAGNOSIS — M51.36 DEGENERATION OF LUMBAR INTERVERTEBRAL DISC: ICD-10-CM

## 2024-01-11 DIAGNOSIS — Z86.14 HISTORY OF MRSA INFECTION: ICD-10-CM

## 2024-01-11 DIAGNOSIS — R39.15 URINARY URGENCY: ICD-10-CM

## 2024-01-11 DIAGNOSIS — F42.4 EXCORIATION (SKIN-PICKING) DISORDER: Primary | ICD-10-CM

## 2024-01-11 DIAGNOSIS — R60.9 EDEMA, UNSPECIFIED TYPE: ICD-10-CM

## 2024-01-11 DIAGNOSIS — M62.81 MUSCLE WEAKNESS (GENERALIZED): ICD-10-CM

## 2024-01-11 DIAGNOSIS — M19.90 OSTEOARTHRITIS, UNSPECIFIED OSTEOARTHRITIS TYPE, UNSPECIFIED SITE: ICD-10-CM

## 2024-01-11 DIAGNOSIS — Z74.1 PERSONAL CARE IMPAIRMENT: ICD-10-CM

## 2024-01-11 DIAGNOSIS — S22.079D UNSPECIFIED FRACTURE OF T9-T10 VERTEBRA, SUBSEQUENT ENCOUNTER FOR FRACTURE WITH ROUTINE HEALING: ICD-10-CM

## 2024-01-11 DIAGNOSIS — J30.9 ALLERGIC RHINITIS, UNSPECIFIED SEASONALITY, UNSPECIFIED TRIGGER: ICD-10-CM

## 2024-01-11 PROBLEM — M51.369 DEGENERATION OF LUMBAR INTERVERTEBRAL DISC: Status: ACTIVE | Noted: 2024-01-11

## 2024-01-11 LAB
ALBUMIN/GLOBULIN RATIO: 1 G/DL
ALBUMIN: 3.3 G/DL (ref 3.5–5)
ALP BLD-CCNC: 106 UNITS/L (ref 38–126)
ALT SERPL-CCNC: 27 UNITS/L (ref 4–35)
ANION GAP SERPL CALCULATED.3IONS-SCNC: 11 MMOL/L (ref 12–16)
AST SERPL-CCNC: 47 UNITS/L (ref 14–36)
BACTERIA, URINE: ABNORMAL /HPF
BASOPHILS %: 0.65 (ref 0–3)
BASOPHILS ABSOLUTE: 0.05 (ref 0–0.3)
BILIRUB SERPL-MCNC: 0.7 MG/DL (ref 0.2–1.3)
BILIRUBIN URINE: ABNORMAL
BLOOD, URINE: NEGATIVE
BUN BLDV-MCNC: 18 MG/DL (ref 7–17)
CALCIUM OXALATE CRYSTALS: ABNORMAL /HPF
CALCIUM SERPL-MCNC: 9 MG/DL (ref 8.4–10.2)
CASTS UA: ABNORMAL /LPF
CHLORIDE BLD-SCNC: 100 MMOL/L (ref 98–120)
CLARITY: ABNORMAL
CO2: 31 MMOL/L (ref 22–31)
COLOR, URINE: ABNORMAL
CREAT SERPL-MCNC: 0.9 MG/DL (ref 0.5–1)
CRYSTALS, UA: PRESENT
EOSINOPHILS %: 8.87 (ref 0–10)
EOSINOPHILS ABSOLUTE: 0.67 (ref 0–1.1)
GFR CALCULATED: > 60
GLOBULIN: 3.3 G/DL
GLUCOSE URINE: NEGATIVE MG/DL
GLUCOSE: 191 MG/DL (ref 65–105)
HCT VFR BLD CALC: 41.4 % (ref 37–47)
HEMOGLOBIN: 12.5 (ref 12–16)
KETONES, URINE: NEGATIVE MG/DL
LEUKOCYTE ESTERASE, URINE: ABNORMAL
LYMPHOCYTE %: 33.73 (ref 20–51.1)
LYMPHOCYTES ABSOLUTE: 2.55 (ref 1–5.5)
MCH RBC QN AUTO: 28.7 PG (ref 28.5–32.5)
MCHC RBC AUTO-ENTMCNC: 30.1 G/DL (ref 32–37)
MCV RBC AUTO: 95.4 FL (ref 80–94)
MONOCYTES %: 9.6 (ref 1.7–9.3)
MONOCYTES ABSOLUTE: 0.73 (ref 0.1–1)
NEUTROPHILS %: 47.16 (ref 42.2–75.2)
NEUTROPHILS ABSOLUTE: 3.56 (ref 2–8.1)
NITRITE, URINE: NEGATIVE
PDW BLD-RTO: 13.3 % (ref 8.5–15.5)
PH UA: 6 (ref 5–8.5)
PLATELET # BLD: 383.7 THOU/MM3 (ref 130–400)
POTASSIUM SERPL-SCNC: 4 MMOL/L (ref 3.6–5)
PROTEIN UA: ABNORMAL MG/DL
RBC URINE: ABNORMAL /HPF (ref 0–2)
RBC: 4.34 M/UL (ref 4.2–5.4)
SODIUM BLD-SCNC: 138 MMOL/L (ref 135–145)
SPECIFIC GRAVITY, URINE: 1.02 MG/DL (ref 1–1.03)
SQUAMOUS EPITHELIAL: ABNORMAL /HPF
TOTAL PROTEIN, SERUM: 6.6 G/DL (ref 6.3–8.2)
UROBILINOGEN, URINE: 0.2 MG/DL (ref 0.2–1)
WBC URINE: ABNORMAL /HPF (ref 0–4)
WBC: 7.6 THOU/ML3 (ref 4.8–10.8)

## 2024-01-11 RX ORDER — OXYCODONE HYDROCHLORIDE 5 MG/1
0.5 TABLET ORAL EVERY 4 HOURS PRN
COMMUNITY

## 2024-01-11 RX ORDER — GABAPENTIN 250 MG/5ML
2 SOLUTION ORAL 3 TIMES DAILY
COMMUNITY

## 2024-01-11 RX ORDER — ACETAMINOPHEN 325 MG/1
650 TABLET ORAL EVERY 6 HOURS PRN
COMMUNITY

## 2024-01-11 RX ORDER — FUROSEMIDE 40 MG/1
40 TABLET ORAL EVERY MORNING
COMMUNITY

## 2024-01-11 RX ORDER — AZELASTINE HYDROCHLORIDE 137 UG/1
2 SPRAY, METERED NASAL EVERY MORNING
COMMUNITY

## 2024-01-11 RX ORDER — NYSTATIN 100000 [USP'U]/G
POWDER TOPICAL
COMMUNITY

## 2024-01-11 RX ORDER — NICOTINE POLACRILEX 4 MG
15 LOZENGE BUCCAL PRN
COMMUNITY

## 2024-01-11 RX ORDER — LIRAGLUTIDE 6 MG/ML
1.2 INJECTION SUBCUTANEOUS DAILY
COMMUNITY

## 2024-01-11 RX ORDER — BACLOFEN 10 MG/1
10 TABLET ORAL EVERY MORNING
COMMUNITY

## 2024-01-11 RX ORDER — ENOXAPARIN SODIUM 100 MG/ML
0.4 INJECTION SUBCUTANEOUS EVERY 12 HOURS
COMMUNITY

## 2024-01-11 RX ORDER — ALBUTEROL SULFATE 90 UG/1
2 AEROSOL, METERED RESPIRATORY (INHALATION) EVERY 6 HOURS PRN
COMMUNITY

## 2024-01-13 ENCOUNTER — TELEPHONE (OUTPATIENT)
Dept: INTERNAL MEDICINE | Age: 79
End: 2024-01-13

## 2024-01-13 NOTE — TELEPHONE ENCOUNTER
Received notification from [facility] while on call, spoke with nurse Gayle. UA C&S results noted susceptibility to ceftriaxone, macrobid, and levaquin. Patient has not been started on ATB at this time per nurse. No allergies to medications per EMR or nurse. Gave verbal order for Levaquin 250 mg QD x 3 days      CrCl 105

## 2024-01-16 ENCOUNTER — TELEPHONE (OUTPATIENT)
Dept: FAMILY MEDICINE CLINIC | Age: 79
End: 2024-01-16

## 2024-01-16 RX ORDER — LEVOFLOXACIN 250 MG/1
250 TABLET, FILM COATED ORAL DAILY
COMMUNITY
Start: 2024-01-13 | End: 2024-01-20

## 2024-01-17 RX ORDER — LOPERAMIDE HYDROCHLORIDE 2 MG/1
2 CAPSULE ORAL PRN
COMMUNITY

## 2024-01-17 RX ORDER — ERGOCALCIFEROL 1.25 MG/1
50000 CAPSULE ORAL WEEKLY
COMMUNITY

## 2024-01-19 ENCOUNTER — OUTSIDE SERVICES (OUTPATIENT)
Dept: FAMILY MEDICINE CLINIC | Age: 79
End: 2024-01-19

## 2024-01-19 DIAGNOSIS — N18.4 CHRONIC KIDNEY DISEASE, STAGE IV (SEVERE) (HCC): ICD-10-CM

## 2024-01-19 DIAGNOSIS — K21.9 GASTROESOPHAGEAL REFLUX DISEASE, UNSPECIFIED WHETHER ESOPHAGITIS PRESENT: ICD-10-CM

## 2024-01-19 DIAGNOSIS — R32 URINARY INCONTINENCE, UNSPECIFIED TYPE: ICD-10-CM

## 2024-01-19 DIAGNOSIS — G47.00 INSOMNIA, UNSPECIFIED TYPE: ICD-10-CM

## 2024-01-19 DIAGNOSIS — L98.1 DERMATITIS FACTITIA: ICD-10-CM
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
## 2024-01-19 DIAGNOSIS — Z86.79 HISTORY OF CONGESTIVE HEART FAILURE: ICD-10-CM

## 2024-01-19 DIAGNOSIS — N18.6 TYPE 2 DIABETES MELLITUS WITH ESRD (END-STAGE RENAL DISEASE) (HCC): ICD-10-CM

## 2024-01-19 DIAGNOSIS — E13.42 DIABETIC POLYNEUROPATHY ASSOCIATED WITH OTHER SPECIFIED DIABETES MELLITUS (HCC): ICD-10-CM

## 2024-01-19 DIAGNOSIS — E78.5 HYPERLIPIDEMIA, UNSPECIFIED HYPERLIPIDEMIA TYPE: ICD-10-CM

## 2024-01-19 DIAGNOSIS — Z87.39 HISTORY OF DEGENERATIVE DISC DISEASE: ICD-10-CM

## 2024-01-19 DIAGNOSIS — Z87.442 HISTORY OF KIDNEY STONES: ICD-10-CM

## 2024-01-19 DIAGNOSIS — E03.9 PRIMARY HYPOTHYROIDISM: ICD-10-CM

## 2024-01-19 DIAGNOSIS — E11.22 TYPE 2 DIABETES MELLITUS WITH ESRD (END-STAGE RENAL DISEASE) (HCC): ICD-10-CM

## 2024-01-19 DIAGNOSIS — J45.909 ASTHMA, UNSPECIFIED ASTHMA SEVERITY, UNSPECIFIED WHETHER COMPLICATED, UNSPECIFIED WHETHER PERSISTENT: ICD-10-CM

## 2024-01-19 DIAGNOSIS — Z86.711 HISTORY OF PULMONARY EMBOLUS (PE): ICD-10-CM

## 2024-01-19 DIAGNOSIS — F32.9 MAJOR DEPRESSIVE DISORDER WITH SINGLE EPISODE, REMISSION STATUS UNSPECIFIED: ICD-10-CM

## 2024-02-12 NOTE — PROGRESS NOTES
well perfused. She is able to move all of her fingers and toes.  Lower extremities have a little bit of erythema over the lower end to your legs without warmth or drainage. No significant pitting edema.  Skin:  Back incisions were bandaged and left intact.         Orders Only on 01/11/2024   Component Date Value Ref Range Status    WBC 01/11/2024 7.6  4.8 - 10.8 Thou/mL3 Final    RBC 01/11/2024 4.34  4.20 - 5.40 M/uL Final    Hemoglobin 01/11/2024 12.5  12.0 - 16.0 Final    Hematocrit 01/11/2024 41.4  37.0 - 47.0 % Final    MCV 01/11/2024 95.4 (H)  80.0 - 94.0 fl Final    MCH 01/11/2024 28.7  28.5 - 32.5 pg Final    MCHC 01/11/2024 30.1 (L)  32.0 - 37.0 g/dL Final    RDW 01/11/2024 13.3  8.5 - 15.5 % Final    Platelets 01/11/2024 383.7  130 - 400 Thou/mm3 Final    Neutrophils % 01/11/2024 47.16  42.2 - 75.2 Final    Lymphocyte % 01/11/2024 33.73  20 - 51.1 Final    Monocytes % 01/11/2024 9.597 (H)  1.7 - 9.3 Final    Eosinophils % 01/11/2024 8.868  0.0 - 10.0 Final    Basophils % 01/11/2024 0.6464  0.0 - 3.0 Final    Neutrophils Absolute 01/11/2024 3.564  2.0 - 8.1 Final    Lymphocytes Absolute 01/11/2024 2.549  1.0 - 5.5 Final    Monocytes Absolute 01/11/2024 0.7253  0.1 - 1.0 Final    Eosinophils Absolute 01/11/2024 0.6702  0.0 - 1.1 Final    Basophils Absolute 01/11/2024 0.0489  0.0 - 0.3 Final    Sodium 01/11/2024 138  135 - 145 mmol/L Final    Potassium 01/11/2024 4.0  3.6 - 5.0 mmol/L Final    Chloride 01/11/2024 100  98 - 120 mmol/L Final    CO2 01/11/2024 31  22 - 31 mmol/L Final    Anion Gap 01/11/2024 11.0 (L)  12 - 16 mmol/L Final    BUN 01/11/2024 18 (H)  7 - 17 mg/dL Final    Creatinine 01/11/2024 0.9  0.5 - 1.0 mg/dL Final    Gfr Calculated 01/11/2024 > 60.0   Final    Glucose 01/11/2024 191 (H)  65 - 105 mg/dL Final    Calcium 01/11/2024 9.0  8.4 - 10.2 mg/dL Final    Total Bilirubin 01/11/2024 0.7  0.2 - 1.3 mg/dL Final    AST 01/11/2024 47 (H)  14 - 36 units/L Final    ALT 01/11/2024 27  4 - 35

## 2024-02-19 RX ORDER — AZITHROMYCIN 250 MG/1
250 TABLET, FILM COATED ORAL DAILY
COMMUNITY
Start: 2024-02-19 | End: 2024-02-24

## 2024-02-19 RX ORDER — PREDNISONE 20 MG/1
40 TABLET ORAL DAILY
COMMUNITY
Start: 2024-02-19 | End: 2024-02-24

## 2024-02-21 LAB — LEGIONELLA AG, URINE: NEGATIVE

## 2024-02-27 ENCOUNTER — OUTSIDE SERVICES (OUTPATIENT)
Dept: INTERNAL MEDICINE | Age: 79
End: 2024-02-27
Payer: MEDICARE

## 2024-02-27 DIAGNOSIS — E03.9 HYPOTHYROIDISM, ADULT: Primary | ICD-10-CM

## 2024-02-27 DIAGNOSIS — F03.90 DEMENTIA, UNSPECIFIED DEMENTIA SEVERITY, UNSPECIFIED DEMENTIA TYPE, UNSPECIFIED WHETHER BEHAVIORAL, PSYCHOTIC, OR MOOD DISTURBANCE OR ANXIETY (HCC): ICD-10-CM

## 2024-02-27 DIAGNOSIS — G47.00 INSOMNIA, UNSPECIFIED TYPE: ICD-10-CM

## 2024-02-27 DIAGNOSIS — E11.40 TYPE 2 DIABETES MELLITUS WITH DIABETIC NEUROPATHY, UNSPECIFIED WHETHER LONG TERM INSULIN USE (HCC): ICD-10-CM

## 2024-02-27 PROCEDURE — 99308 SBSQ NF CARE LOW MDM 20: CPT | Performed by: NURSE PRACTITIONER

## 2024-02-28 ASSESSMENT — ENCOUNTER SYMPTOMS
COLOR CHANGE: 0
CONSTIPATION: 0
VOMITING: 0
RHINORRHEA: 0
ABDOMINAL PAIN: 0
EYE PAIN: 0
WHEEZING: 0
NAUSEA: 0
TROUBLE SWALLOWING: 0
CHEST TIGHTNESS: 0
COUGH: 0
SHORTNESS OF BREATH: 0
SINUS PRESSURE: 0
BLOOD IN STOOL: 0
FACIAL SWELLING: 0
SORE THROAT: 0
DIARRHEA: 0

## 2024-02-28 NOTE — PROGRESS NOTES
12.5  Hematocrit 41.4  Platelets 383.7      Immunization History   Administered Date(s) Administered    COVID-19, PFIZER PURPLE top, DILUTE for use, (age 12 y+), 30mcg/0.3mL 01/30/2021, 02/19/2021    Influenza Virus Vaccine 10/30/2008    Influenza, FLUAD, (age 65 y+), Adjuvanted, 0.5mL 02/10/2023    Influenza, FLUARIX, FLULAVAL, FLUZONE (age 6 mo+) AND AFLURIA, (age 3 y+), PF, 0.5mL 11/13/2017, 11/29/2018, 10/24/2019, 09/17/2020    Pneumococcal, PCV-13, PREVNAR 13, (age 6w+), IM, 0.5mL 11/03/2015    TDaP, ADACEL (age 10y-64y), BOOSTRIX (age 10y+), IM, 0.5mL 10/02/2009       Allergies   Allergen Reactions    Dust Mite Extract     Molds & Smuts     Pollen Extract     Aspirin     Adhesive Tape        Past Medical History:   Diagnosis Date    Acid reflux     Allergic rhinitis     Asthma     Cellulitis of left foot     Chronic kidney disease, unspecified     Degeneration of lumbar intervertebral disc     Depression     Diabetes mellitus (HCC)     Diabetic neuropathy (HCC)     Edema     Excoriation (skin-picking) disorder     Fall, subsequent encounter     GERD (gastroesophageal reflux disease)     Heart failure, unspecified ([facility])     History of MRSA infection     Hyperlipidemia     Hypothyroidism, adult     Insomnia     Mild cognitive impairment     Muscle weakness (generalized)     Osteoarthritis     Personal care impairment     Thyroid disease     Unspecified fracture of t9-t10 vertebra, subsequent encounter for fracture with routine healing     Unsteady gait     Urinary frequency     Urinary urgency        Past Surgical History:   Procedure Laterality Date    FRACTURE SURGERY  01/2024    T10 fracture surgery [facility]       Social History     Socioeconomic History    Marital status: Single     Spouse name: Not on file    Number of children: Not on file    Years of education: Not on file    Highest education level: Not on file   Occupational History    Not on file   Tobacco Use    Smoking status: Never    Smokeless tobacco:

## 2024-03-08 RX ORDER — FLUTICASONE PROPIONATE AND SALMETEROL 250; 50 UG/1; UG/1
1 POWDER RESPIRATORY (INHALATION) DAILY
COMMUNITY

## 2024-03-29 ENCOUNTER — OUTSIDE SERVICES (OUTPATIENT)
Dept: FAMILY MEDICINE CLINIC | Age: 79
End: 2024-03-29
Payer: MEDICARE

## 2024-03-29 DIAGNOSIS — N18.4 CHRONIC KIDNEY DISEASE, STAGE IV (SEVERE) (HCC): ICD-10-CM

## 2024-03-29 DIAGNOSIS — E11.22 TYPE 2 DIABETES MELLITUS WITH ESRD (END-STAGE RENAL DISEASE) (HCC): ICD-10-CM

## 2024-03-29 DIAGNOSIS — F32.9 MAJOR DEPRESSIVE DISORDER WITH SINGLE EPISODE, REMISSION STATUS UNSPECIFIED: ICD-10-CM

## 2024-03-29 DIAGNOSIS — E13.42 DIABETIC POLYNEUROPATHY ASSOCIATED WITH OTHER SPECIFIED DIABETES MELLITUS (HCC): ICD-10-CM

## 2024-03-29 DIAGNOSIS — N18.6 TYPE 2 DIABETES MELLITUS WITH ESRD (END-STAGE RENAL DISEASE) (HCC): ICD-10-CM

## 2024-03-29 DIAGNOSIS — Z87.442 HISTORY OF KIDNEY STONES: ICD-10-CM

## 2024-03-29 DIAGNOSIS — G47.00 INSOMNIA, UNSPECIFIED TYPE: ICD-10-CM

## 2024-03-29 DIAGNOSIS — E03.9 PRIMARY HYPOTHYROIDISM: ICD-10-CM

## 2024-03-29 DIAGNOSIS — R32 URINARY INCONTINENCE, UNSPECIFIED TYPE: ICD-10-CM

## 2024-03-29 DIAGNOSIS — Z86.711 HISTORY OF PULMONARY EMBOLUS (PE): ICD-10-CM

## 2024-03-29 DIAGNOSIS — E78.5 HYPERLIPIDEMIA, UNSPECIFIED HYPERLIPIDEMIA TYPE: ICD-10-CM

## 2024-03-29 DIAGNOSIS — Z86.79 HISTORY OF CONGESTIVE HEART FAILURE: ICD-10-CM

## 2024-03-29 DIAGNOSIS — L98.1 DERMATITIS FACTITIA: ICD-10-CM

## 2024-03-29 DIAGNOSIS — J45.909 ASTHMA, UNSPECIFIED ASTHMA SEVERITY, UNSPECIFIED WHETHER COMPLICATED, UNSPECIFIED WHETHER PERSISTENT: ICD-10-CM

## 2024-03-29 DIAGNOSIS — K21.9 GASTROESOPHAGEAL REFLUX DISEASE, UNSPECIFIED WHETHER ESOPHAGITIS PRESENT: ICD-10-CM
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
## 2024-03-29 DIAGNOSIS — Z87.39 HISTORY OF DEGENERATIVE DISC DISEASE: ICD-10-CM

## 2024-03-29 PROCEDURE — 99309 SBSQ NF CARE MODERATE MDM 30: CPT | Performed by: FAMILY MEDICINE

## 2024-04-19 NOTE — PROGRESS NOTES
incontinence. Pain overall seems well controlled.  History of degenerative disc disease.  Hyperlipidemia. Plan to continue atorvastatin 10 milligrams a day. Will plan on updating labs if she is here long term.  History of congestive heart failure. Patient looks currently well balanced. There is no evidence of peripheral edema. She does have some small pleural effusions noted during her last chest x-ray in the hospital. She continues on Lasix 40 milligrams a day.  History of asthma. No recent exacerbation or concerns. She has albuterol aerosols as needed.   History of pulmonary embolus. Patient is not on any chronic anticoagulation due to prior history of GI blood loss and anemia.   Adult-onset Diabetes. Patient is on metformin, Tradjenta, and Victoza. Update HA1c and follow up labs if she remains long term.  Hypothyroidism. Patient will be maintained on their current maintenance dose of Synthroid 150 micrograms per day.   History of GERD. Patient reports no ongoing symptoms. Plan to continue current dosing of Protonix 40 milligrams daily.  History of severe depression.  Patient is currently on the combination of Cymbalta and Lamotrigine. She is followed by psych services. Symptoms worse when patients confused. She has had a better week this week and is more closer to her baseline right now. Still has anxiety about treatments.   History of chronic kidney disease. Relatively stable. Consider serial follow up with updated labs.  History of kidney stone. Quiescent.   History of hyper reflexive bladder and incontinence, ongoing. No current issues.   Factitious dermatitis. Patient does not currently have any open lesions, but she is known to  and lesions on her arms.  History of neuropathy. Mostly in her feet. Currently she is on no active treatment consider Lyrica or gabapentin if feet become painful.  History of insomnia. Patient currently using Trazodone and melatonin at bedtime to good effect.    Remainder of

## 2024-04-23 ENCOUNTER — OUTSIDE SERVICES (OUTPATIENT)
Dept: INTERNAL MEDICINE | Age: 79
End: 2024-04-23
Payer: MEDICARE

## 2024-04-23 DIAGNOSIS — F03.90 DEMENTIA, UNSPECIFIED DEMENTIA SEVERITY, UNSPECIFIED DEMENTIA TYPE, UNSPECIFIED WHETHER BEHAVIORAL, PSYCHOTIC, OR MOOD DISTURBANCE OR ANXIETY (HCC): Primary | ICD-10-CM

## 2024-04-23 DIAGNOSIS — G47.00 INSOMNIA, UNSPECIFIED TYPE: ICD-10-CM

## 2024-04-23 DIAGNOSIS — E11.40 TYPE 2 DIABETES MELLITUS WITH DIABETIC NEUROPATHY, WITHOUT LONG-TERM CURRENT USE OF INSULIN (HCC): ICD-10-CM

## 2024-04-23 DIAGNOSIS — N18.31 STAGE 3A CHRONIC KIDNEY DISEASE (HCC): ICD-10-CM

## 2024-04-23 DIAGNOSIS — E03.9 HYPOTHYROIDISM, ADULT: ICD-10-CM

## 2024-04-23 PROCEDURE — 99308 SBSQ NF CARE LOW MDM 20: CPT | Performed by: NURSE PRACTITIONER

## 2024-04-23 RX ORDER — DESVENLAFAXINE SUCCINATE 50 MG/1
50 TABLET, EXTENDED RELEASE ORAL DAILY
COMMUNITY

## 2024-04-23 NOTE — PROGRESS NOTES
Procedure Laterality Date    FRACTURE SURGERY  01/2024    T10 fracture surgery [facility]       Social History     Socioeconomic History    Marital status: Single     Spouse name: Not on file    Number of children: Not on file    Years of education: Not on file    Highest education level: Not on file   Occupational History    Not on file   Tobacco Use    Smoking status: Never    Smokeless tobacco: Never   Substance and Sexual Activity    Alcohol use: Not on file    Drug use: Not on file    Sexual activity: Not on file   Other Topics Concern    Not on file   Social History Narrative    Not on file     Social Determinants of Health     Financial Resource Strain: Not on file   Food Insecurity: Not on file   Transportation Needs: Not on file   Physical Activity: Not on file   Stress: Not on file   Social Connections: Not on file   Intimate Partner Violence: Not on file   Housing Stability: Not on file       Current Outpatient Medications on File Prior to Visit   Medication Sig Dispense Refill    desvenlafaxine succinate (PRISTIQ) 50 MG TB24 extended release tablet Take 1 tablet by mouth daily      fluticasone-salmeterol (ADVAIR DISKUS) 250-50 MCG/ACT AEPB diskus inhaler Inhale 1 puff into the lungs daily      loperamide (IMODIUM) 2 MG capsule Take 1 capsule by mouth as needed for Diarrhea Max: 6 per day      vitamin D (ERGOCALCIFEROL) 1.25 MG (16399 UT) CAPS capsule Take 1 capsule by mouth once a week      albuterol sulfate HFA (PROVENTIL;VENTOLIN;PROAIR) 108 (90 Base) MCG/ACT inhaler Inhale 2 puffs into the lungs every 6 hours as needed for Wheezing or Shortness of Breath      Azelastine HCl 137 MCG/SPRAY SOLN 2 sprays by Each Nostril route every morning      baclofen (LIORESAL) 10 MG tablet Take 1 tablet by mouth every morning      gabapentin (NEURONTIN) 250 MG/5ML solution Take 2 mLs by mouth 3 times daily. (Patient not taking: Reported on 1/17/2024)      glucagon 1 MG injection Inject 1 mg into the muscle as needed

## 2024-05-17 ENCOUNTER — OUTSIDE SERVICES (OUTPATIENT)
Dept: FAMILY MEDICINE CLINIC | Age: 79
End: 2024-05-17
Payer: MEDICARE

## 2024-05-17 DIAGNOSIS — L98.1 DERMATITIS FACTITIA: ICD-10-CM

## 2024-05-17 DIAGNOSIS — Z86.711 HISTORY OF PULMONARY EMBOLUS (PE): ICD-10-CM

## 2024-05-17 DIAGNOSIS — J45.909 ASTHMA, UNSPECIFIED ASTHMA SEVERITY, UNSPECIFIED WHETHER COMPLICATED, UNSPECIFIED WHETHER PERSISTENT: ICD-10-CM

## 2024-05-17 DIAGNOSIS — Z87.39 HISTORY OF DEGENERATIVE DISC DISEASE: ICD-10-CM
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
## 2024-05-17 DIAGNOSIS — E13.42 DIABETIC POLYNEUROPATHY ASSOCIATED WITH OTHER SPECIFIED DIABETES MELLITUS (HCC): ICD-10-CM

## 2024-05-17 DIAGNOSIS — K21.9 GASTROESOPHAGEAL REFLUX DISEASE, UNSPECIFIED WHETHER ESOPHAGITIS PRESENT: ICD-10-CM

## 2024-05-17 DIAGNOSIS — Z87.442 HISTORY OF KIDNEY STONES: ICD-10-CM

## 2024-05-17 DIAGNOSIS — E78.5 HYPERLIPIDEMIA, UNSPECIFIED HYPERLIPIDEMIA TYPE: ICD-10-CM

## 2024-05-17 DIAGNOSIS — N18.6 TYPE 2 DIABETES MELLITUS WITH ESRD (END-STAGE RENAL DISEASE) (HCC): ICD-10-CM

## 2024-05-17 DIAGNOSIS — E03.9 PRIMARY HYPOTHYROIDISM: ICD-10-CM

## 2024-05-17 DIAGNOSIS — N18.4 CHRONIC KIDNEY DISEASE, STAGE IV (SEVERE) (HCC): ICD-10-CM

## 2024-05-17 DIAGNOSIS — R32 URINARY INCONTINENCE, UNSPECIFIED TYPE: ICD-10-CM

## 2024-05-17 DIAGNOSIS — G47.00 INSOMNIA, UNSPECIFIED TYPE: ICD-10-CM

## 2024-05-17 DIAGNOSIS — Z86.79 HISTORY OF CONGESTIVE HEART FAILURE: ICD-10-CM

## 2024-05-17 DIAGNOSIS — F32.9 MAJOR DEPRESSIVE DISORDER WITH SINGLE EPISODE, REMISSION STATUS UNSPECIFIED: ICD-10-CM

## 2024-05-17 DIAGNOSIS — E11.22 TYPE 2 DIABETES MELLITUS WITH ESRD (END-STAGE RENAL DISEASE) (HCC): ICD-10-CM

## 2024-05-17 PROCEDURE — 99309 SBSQ NF CARE MODERATE MDM 30: CPT | Performed by: FAMILY MEDICINE

## 2024-06-06 LAB
ALBUMIN/GLOBULIN RATIO: 1.2 G/DL
ALBUMIN: 3.6 G/DL (ref 3.5–5)
ALP BLD-CCNC: 72 UNITS/L (ref 38–126)
ALT SERPL-CCNC: 14 UNITS/L (ref 4–35)
ANION GAP SERPL CALCULATED.3IONS-SCNC: 6.5 MMOL/L (ref 3–11)
AST SERPL-CCNC: 18 UNITS/L (ref 14–36)
BASOPHILS ABSOLUTE: 0.05 (ref 0–0.3)
BASOPHILS RELATIVE PERCENT: 0.7 (ref 0–3)
BILIRUB SERPL-MCNC: 0.7 MG/DL (ref 0.2–1.3)
BUN BLDV-MCNC: 22 MG/DL (ref 7–17)
CALCIUM SERPL-MCNC: 9.2 MG/DL (ref 8.4–10.2)
CHLORIDE BLD-SCNC: 95 MMOL/L (ref 98–120)
CHOLESTEROL, TOTAL: 131 MG/DL (ref 50–200)
CHOLESTEROL/HDL RATIO: 3 RATIO (ref 0–4.5)
CO2: 38 MMOL/L (ref 22–31)
CREAT SERPL-MCNC: 0.9 MG/DL (ref 0.5–1)
EOSINOPHILS ABSOLUTE: 0.38 (ref 0–1.1)
EOSINOPHILS RELATIVE PERCENT: 5.41 (ref 0–10)
GFR, ESTIMATED: > 60
GLOBULIN: 3.1 G/DL
GLUCOSE: 165 MG/DL (ref 65–105)
HBA1C MFR BLD: 7.1 % (ref 4.4–6.4)
HCT VFR BLD CALC: 41.7 % (ref 37–47)
HDLC SERPL-MCNC: 50 MG/DL (ref 36–68)
HEMOGLOBIN: 12.5 (ref 12–16)
LDL CHOLESTEROL: 47.2 MG/DL (ref 0–160)
LYMPHOCYTES ABSOLUTE: 2.31 (ref 1–5.5)
LYMPHOCYTES RELATIVE PERCENT: 33.14 (ref 20–51.1)
MCH RBC QN AUTO: 25.7 PG (ref 28.5–32.5)
MCHC RBC AUTO-ENTMCNC: 30 G/DL (ref 32–37)
MCV RBC AUTO: 85.6 FL (ref 80–94)
MONOCYTES ABSOLUTE: 0.53 (ref 0.1–1)
MONOCYTES RELATIVE PERCENT: 7.58 (ref 1.7–9.3)
NEUTROPHILS ABSOLUTE: 3.7 (ref 2–8.1)
NEUTROPHILS RELATIVE PERCENT: 53.17 (ref 42.2–75.2)
PDW BLD-RTO: 15 % (ref 8.5–15.5)
PLATELET # BLD: 270.2 THOU/MM3 (ref 130–400)
POTASSIUM SERPL-SCNC: 4.5 MMOL/L (ref 3.6–5)
RBC # BLD: 4.87 M/UL (ref 4.2–5.4)
SODIUM BLD-SCNC: 135 MMOL/L (ref 135–145)
T4 FREE: 1.86 NG/DL (ref 0.78–2.19)
TOTAL PROTEIN: 6.7 G/DL (ref 6.3–8.2)
TRIGL SERPL-MCNC: 169 MG/DL (ref 10–250)
TSH SERPL DL<=0.05 MIU/L-ACNC: 2.51 MIU/ML (ref 0.49–4.67)
VITAMIN D 25-HYDROXY: 51.9 NG/ML (ref 30–100)
VLDLC SERPL CALC-MCNC: 34 MG/DL (ref 0–50)
WBC # BLD: 7 THOU/ML3 (ref 4.8–10.8)

## 2024-06-07 NOTE — PROGRESS NOTES
disease. Relatively stable. Consider serial follow up with updated labs.  History of kidney stone. Quiescent.   History of hyper reflexive bladder and incontinence, ongoing. No current issues.   Factitious dermatitis. Patient does not currently have any open lesions, but she is known to  and lesions on her arms.  History of neuropathy. Mostly in her feet. Currently she is on no active treatment consider Lyrica or gabapentin if feet become painful.  History of insomnia. Patient currently using Trazodone and melatonin at bedtime to good effect.    Remainder of chronic health conditions stable and unchanged.  Plan as noted above. Will follow up for routine monthly rounds.  They will call sooner with any concerns prior.         Bisi POLANCO am personally transcribing for José Miguel Sexton MD 6/7/24 at 2:43 PM EDT.        José Miguel POLANCO MD, personally performed the services described in this document as transcribed by the , and it is both accurate and complete.    Electronically signed by José Miguel Sexton MD on 7/17/24 at 1:15 PM EDT

## 2024-06-25 ENCOUNTER — OUTSIDE SERVICES (OUTPATIENT)
Dept: INTERNAL MEDICINE | Age: 79
End: 2024-06-25

## 2024-06-25 VITALS
DIASTOLIC BLOOD PRESSURE: 66 MMHG | WEIGHT: 275.3 LBS | HEART RATE: 69 BPM | RESPIRATION RATE: 18 BRPM | TEMPERATURE: 97.1 F | SYSTOLIC BLOOD PRESSURE: 102 MMHG | OXYGEN SATURATION: 95 %

## 2024-06-25 DIAGNOSIS — Z00.00 ROUTINE PHYSICAL EXAMINATION: Primary | ICD-10-CM

## 2024-06-25 DIAGNOSIS — G47.00 INSOMNIA, UNSPECIFIED TYPE: ICD-10-CM

## 2024-06-25 DIAGNOSIS — F03.90 DEMENTIA, UNSPECIFIED DEMENTIA SEVERITY, UNSPECIFIED DEMENTIA TYPE, UNSPECIFIED WHETHER BEHAVIORAL, PSYCHOTIC, OR MOOD DISTURBANCE OR ANXIETY (HCC): ICD-10-CM

## 2024-06-25 DIAGNOSIS — E03.9 HYPOTHYROIDISM, ADULT: ICD-10-CM

## 2024-06-25 DIAGNOSIS — M25.561 CHRONIC PAIN OF BOTH KNEES: ICD-10-CM

## 2024-06-25 DIAGNOSIS — G89.29 CHRONIC LEFT SHOULDER PAIN: ICD-10-CM

## 2024-06-25 DIAGNOSIS — M25.512 CHRONIC LEFT SHOULDER PAIN: ICD-10-CM

## 2024-06-25 DIAGNOSIS — N18.31 STAGE 3A CHRONIC KIDNEY DISEASE (HCC): ICD-10-CM

## 2024-06-25 DIAGNOSIS — G89.29 CHRONIC PAIN OF BOTH KNEES: ICD-10-CM

## 2024-06-25 DIAGNOSIS — E11.40 TYPE 2 DIABETES MELLITUS WITH DIABETIC NEUROPATHY, UNSPECIFIED WHETHER LONG TERM INSULIN USE (HCC): ICD-10-CM

## 2024-06-25 DIAGNOSIS — F32.A DEPRESSION, UNSPECIFIED DEPRESSION TYPE: ICD-10-CM

## 2024-06-25 DIAGNOSIS — M25.562 CHRONIC PAIN OF BOTH KNEES: ICD-10-CM

## 2024-06-25 RX ORDER — GUAIFENESIN 600 MG/1
600 TABLET, EXTENDED RELEASE ORAL EVERY 12 HOURS PRN
COMMUNITY

## 2024-06-25 RX ORDER — DESVENLAFAXINE 100 MG/1
1 TABLET, EXTENDED RELEASE ORAL DAILY
COMMUNITY

## 2024-06-25 ASSESSMENT — PATIENT HEALTH QUESTIONNAIRE - PHQ9
SUM OF ALL RESPONSES TO PHQ QUESTIONS 1-9: 5
SUM OF ALL RESPONSES TO PHQ9 QUESTIONS 1 & 2: 2
SUM OF ALL RESPONSES TO PHQ QUESTIONS 1-9: 5
9. THOUGHTS THAT YOU WOULD BE BETTER OFF DEAD, OR OF HURTING YOURSELF: NOT AT ALL
5. POOR APPETITE OR OVEREATING: NOT AT ALL
SUM OF ALL RESPONSES TO PHQ QUESTIONS 1-9: 5
6. FEELING BAD ABOUT YOURSELF - OR THAT YOU ARE A FAILURE OR HAVE LET YOURSELF OR YOUR FAMILY DOWN: NOT AT ALL
3. TROUBLE FALLING OR STAYING ASLEEP: MORE THAN HALF THE DAYS
10. IF YOU CHECKED OFF ANY PROBLEMS, HOW DIFFICULT HAVE THESE PROBLEMS MADE IT FOR YOU TO DO YOUR WORK, TAKE CARE OF THINGS AT HOME, OR GET ALONG WITH OTHER PEOPLE: SOMEWHAT DIFFICULT
SUM OF ALL RESPONSES TO PHQ QUESTIONS 1-9: 5
7. TROUBLE CONCENTRATING ON THINGS, SUCH AS READING THE NEWSPAPER OR WATCHING TELEVISION: NOT AT ALL
4. FEELING TIRED OR HAVING LITTLE ENERGY: SEVERAL DAYS
8. MOVING OR SPEAKING SO SLOWLY THAT OTHER PEOPLE COULD HAVE NOTICED. OR THE OPPOSITE, BEING SO FIGETY OR RESTLESS THAT YOU HAVE BEEN MOVING AROUND A LOT MORE THAN USUAL: NOT AT ALL
2. FEELING DOWN, DEPRESSED OR HOPELESS: SEVERAL DAYS
1. LITTLE INTEREST OR PLEASURE IN DOING THINGS: SEVERAL DAYS

## 2024-06-25 ASSESSMENT — LIFESTYLE VARIABLES
HOW MANY STANDARD DRINKS CONTAINING ALCOHOL DO YOU HAVE ON A TYPICAL DAY: PATIENT DOES NOT DRINK
HOW OFTEN DO YOU HAVE A DRINK CONTAINING ALCOHOL: NEVER

## 2024-06-26 NOTE — PROGRESS NOTES
Medicare Annual Wellness Visit    Mariluz Koenig is here for Medicare AWV    Assessment & Plan   Routine physical examination  Chronic left shoulder pain  Dementia, unspecified dementia severity, unspecified dementia type, unspecified whether behavioral, psychotic, or mood disturbance or anxiety (HCC)  Type 2 diabetes mellitus with diabetic neuropathy, unspecified whether long term insulin use (HCC)  Stage 3a chronic kidney disease (HCC)  Hypothyroidism, adult  Depression, unspecified depression type  Insomnia, unspecified type  Chronic pain of both knees    Recommendations for Preventive Services Due: see orders and patient instructions/AVS.  Recommended screening schedule for the next 5-10 years is provided to the patient in written form: see Patient Instructions/AVS.     No follow-ups on file.     Subjective     Patient's complete Health Risk Assessment and screening values have been reviewed and are found in Flowsheets. The following problems were reviewed today and where indicated follow up appointments were made and/or referrals ordered.    Positive Risk Factor Screenings with Interventions:    Fall Risk:  Do you feel unsteady or are you worried about falling? : (!) yes  2 or more falls in past year?: no  Fall with injury in past year?: no     Interventions:    Reviewed medications, home hazards, visual acuity, and co-morbidities that can increase risk for falls  Patient declines any further physical therapy    Cognitive:      Words recalled: 0 Words Recalled     Total Score Interpretation: Abnormal Mini-Cog  Interventions:  Declines any further intervention    Depression:  PHQ-2 Score: 2  PHQ-9 Total Score: 5    Interpretation:  5-9 mild   10-14 moderate   15-19 moderately severe   20-27 severe   Interventions:  Patient feels mood is stable, continues on Pristiq       Controlled Medication Review:      Today's Pain Level: No data recorded   Opioid Risk: (Low risk score <55) Opioid risk score: 38    Patient

## 2024-07-19 ENCOUNTER — OUTSIDE SERVICES (OUTPATIENT)
Dept: FAMILY MEDICINE CLINIC | Age: 79
End: 2024-07-19

## 2024-07-19 DIAGNOSIS — Z86.79 HISTORY OF CONGESTIVE HEART FAILURE: ICD-10-CM

## 2024-07-19 DIAGNOSIS — Z87.39 HISTORY OF DEGENERATIVE DISC DISEASE: ICD-10-CM

## 2024-07-19 DIAGNOSIS — F32.9 MAJOR DEPRESSIVE DISORDER WITH SINGLE EPISODE, REMISSION STATUS UNSPECIFIED: ICD-10-CM

## 2024-07-19 DIAGNOSIS — Z87.442 HISTORY OF KIDNEY STONES: ICD-10-CM

## 2024-07-19 DIAGNOSIS — K21.9 GASTROESOPHAGEAL REFLUX DISEASE, UNSPECIFIED WHETHER ESOPHAGITIS PRESENT: ICD-10-CM

## 2024-07-19 DIAGNOSIS — G47.00 INSOMNIA, UNSPECIFIED TYPE: ICD-10-CM
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
## 2024-07-19 DIAGNOSIS — N18.4 CHRONIC KIDNEY DISEASE, STAGE IV (SEVERE) (HCC): ICD-10-CM

## 2024-07-19 DIAGNOSIS — L98.1 DERMATITIS FACTITIA: ICD-10-CM

## 2024-07-19 DIAGNOSIS — Z86.711 HISTORY OF PULMONARY EMBOLUS (PE): ICD-10-CM

## 2024-07-19 DIAGNOSIS — R32 URINARY INCONTINENCE, UNSPECIFIED TYPE: ICD-10-CM

## 2024-07-19 DIAGNOSIS — N18.6 TYPE 2 DIABETES MELLITUS WITH ESRD (END-STAGE RENAL DISEASE) (HCC): ICD-10-CM

## 2024-07-19 DIAGNOSIS — E13.42 DIABETIC POLYNEUROPATHY ASSOCIATED WITH OTHER SPECIFIED DIABETES MELLITUS (HCC): ICD-10-CM

## 2024-07-19 DIAGNOSIS — E03.9 PRIMARY HYPOTHYROIDISM: ICD-10-CM

## 2024-07-19 DIAGNOSIS — J45.909 ASTHMA, UNSPECIFIED ASTHMA SEVERITY, UNSPECIFIED WHETHER COMPLICATED, UNSPECIFIED WHETHER PERSISTENT: ICD-10-CM

## 2024-07-19 DIAGNOSIS — E11.22 TYPE 2 DIABETES MELLITUS WITH ESRD (END-STAGE RENAL DISEASE) (HCC): ICD-10-CM

## 2024-07-19 DIAGNOSIS — E78.5 HYPERLIPIDEMIA, UNSPECIFIED HYPERLIPIDEMIA TYPE: ICD-10-CM

## 2024-07-23 ENCOUNTER — OUTSIDE SERVICES (OUTPATIENT)
Dept: INTERNAL MEDICINE | Age: 79
End: 2024-07-23
Payer: MEDICARE

## 2024-07-23 DIAGNOSIS — L30.9 ACUTE DERMATITIS: Primary | ICD-10-CM

## 2024-07-23 DIAGNOSIS — F03.90 DEMENTIA, UNSPECIFIED DEMENTIA SEVERITY, UNSPECIFIED DEMENTIA TYPE, UNSPECIFIED WHETHER BEHAVIORAL, PSYCHOTIC, OR MOOD DISTURBANCE OR ANXIETY (HCC): ICD-10-CM

## 2024-07-23 PROCEDURE — 99307 SBSQ NF CARE SF MDM 10: CPT | Performed by: NURSE PRACTITIONER

## 2024-07-24 RX ORDER — SEMAGLUTIDE 0.68 MG/ML
INJECTION, SOLUTION SUBCUTANEOUS
COMMUNITY
Start: 2024-07-02

## 2024-07-24 NOTE — PROGRESS NOTES
[facility]  Date of Service:  7/19/2024  Mariluz Koenig  Date of Birth 1945  MRN: <[id number]>  Code Status----FULL CODE         HPI:  This is a 78-year-old female who was previously living in assisted living here at [facility]. She had a fall December 31, 2023, within her room area. She had back pain, eventually leading to a diagnosis of a T10 burst fracture. She also had urinary tract infection present on the admission. She underwent a T7 to L1 instrumented fusion with stabilization of the T10 burst fracture. She had some complications with post-op care, with some acute on chronic urinary incontinence with urinary tract infection being treated during this hospitalization. Cognition has been a little variable, her initial week after surgery was complicated by pain and pain medications, currently behaviors improving. She is still not as engaged with therapy and tends to report pain and early fatigue. No significant fever chills. Wound status reported to be OK. Incidental finding of some small bilateral pleural effusions on her most recent x-ray slight left basilar opacity. At the time of her admission, she had tested COVID positive. Suspicion that most of that disease was over prior to her admission and does have some residual lung findings as above, but not currently require any supplemental oxygen.    Over the interval:  Patient is being seen for annual physical exam and follow up at extended care facility. Today she was outside enjoying the nice weather. She generally has no significant concerns. She has some left shoulder and knee pain. Denies any significant rash issues. Mood has been stable. She is a little off today, looking at the papers in my hand saying a lot of the stuff in there is not correct. Otherwise, she feels she is doing well. Staff have no acute concerns. She has not had any falls over the interval. Appetite seems to be good.      LABS:  6-6-2024  Vitamin D

## 2024-07-24 NOTE — PROGRESS NOTES
on file   Tobacco Use    Smoking status: Never    Smokeless tobacco: Never   Substance and Sexual Activity    Alcohol use: Not on file    Drug use: Not on file    Sexual activity: Not on file   Other Topics Concern    Not on file   Social History Narrative    Not on file     Social Determinants of Health     Financial Resource Strain: Not on file   Food Insecurity: Not on file   Transportation Needs: Not on file   Physical Activity: Patient Declined (6/25/2024)    Exercise Vital Sign     Days of Exercise per Week: Patient declined     Minutes of Exercise per Session: Patient declined   Stress: Not on file   Social Connections: Not on file   Intimate Partner Violence: Not on file   Housing Stability: Not on file       Current Outpatient Medications on File Prior to Visit   Medication Sig Dispense Refill    desvenlafaxine succinate (PRISTIQ) 100 MG TB24 extended release tablet Take 1 tablet by mouth daily      guaiFENesin (MUCINEX) 600 MG extended release tablet Take 1 tablet by mouth every 12 hours as needed for Congestion      ALBUTEROL SULFATE IN Inhale into the lungs every 4 hours as needed      fluticasone-salmeterol (ADVAIR DISKUS) 250-50 MCG/ACT AEPB diskus inhaler Inhale 1 puff into the lungs daily      loperamide (IMODIUM) 2 MG capsule Take 1 capsule by mouth as needed for Diarrhea Max: 6 per day      vitamin D (ERGOCALCIFEROL) 1.25 MG (42263 UT) CAPS capsule Take 1 capsule by mouth once a week      albuterol sulfate HFA (PROVENTIL;VENTOLIN;PROAIR) 108 (90 Base) MCG/ACT inhaler Inhale 2 puffs into the lungs every 6 hours as needed for Wheezing or Shortness of Breath      Azelastine HCl 137 MCG/SPRAY SOLN 2 sprays by Each Nostril route every morning      baclofen (LIORESAL) 10 MG tablet Take 1 tablet by mouth every morning      gabapentin (NEURONTIN) 250 MG/5ML solution Take 2 mLs by mouth 3 times daily. (Patient not taking: Reported on 1/17/2024)      glucagon 1 MG injection Inject 1 mg into the muscle as

## 2024-08-01 LAB
ANION GAP SERPL CALCULATED.3IONS-SCNC: 13.3 MMOL/L (ref 3–11)
BASOPHILS ABSOLUTE: 0.03 X10E3/?L (ref 0–0.3)
BASOPHILS RELATIVE PERCENT: 0.65 % (ref 0–3)
BUN BLDV-MCNC: 24 MG/DL (ref 7–17)
CALCIUM SERPL-MCNC: 8.7 MG/DL (ref 8.4–10.2)
CHLORIDE BLD-SCNC: 94 MMOL/L (ref 98–120)
CO2: 34 MMOL/L (ref 22–31)
CREAT SERPL-MCNC: 0.9 MG/DL (ref 0.5–1)
EOSINOPHILS ABSOLUTE: 0.46 X10E3/?L (ref 0–1.1)
EOSINOPHILS RELATIVE PERCENT: 8.6 % (ref 0–10)
GFR, ESTIMATED: > 60
GLUCOSE: 186 MG/DL (ref 65–105)
HCT VFR BLD CALC: 38.9 % (ref 37–47)
HEMOGLOBIN: 11.4 G/DL (ref 12–16)
LYMPHOCYTES ABSOLUTE: 2.1 X10E3/?L (ref 1–5.5)
LYMPHOCYTES RELATIVE PERCENT: 39.66 % (ref 20–51.1)
MCH RBC QN AUTO: 25.3 PG (ref 28.5–32.5)
MCHC RBC AUTO-ENTMCNC: 29.3 G/DL (ref 32–37)
MCV RBC AUTO: 86.2 FL (ref 80–94)
MONOCYTES ABSOLUTE: 0.4 X10E3/?L (ref 0.1–1)
MONOCYTES RELATIVE PERCENT: 7.6 % (ref 1.7–9.3)
NEUTROPHILS ABSOLUTE: 2.3 X10E3/?L (ref 2–8.1)
NEUTROPHILS RELATIVE PERCENT: 43.49 % (ref 42.2–75.2)
PDW BLD-RTO: 15.4 % (ref 8.5–15.5)
PLATELET # BLD: 261.1 THOU/MM3 (ref 130–400)
POTASSIUM SERPL-SCNC: 4.3 MMOL/L (ref 3.6–5)
RBC # BLD: 4.52 M/UL (ref 4.2–5.4)
SODIUM BLD-SCNC: 137 MMOL/L (ref 135–145)
WBC # BLD: 5.3 THOU/ML3 (ref 4.8–10.8)

## 2024-08-16 ENCOUNTER — OUTSIDE SERVICES (OUTPATIENT)
Dept: FAMILY MEDICINE CLINIC | Age: 79
End: 2024-08-16

## 2024-08-16 DIAGNOSIS — L98.1 DERMATITIS FACTITIA: ICD-10-CM

## 2024-08-16 DIAGNOSIS — J45.909 ASTHMA, UNSPECIFIED ASTHMA SEVERITY, UNSPECIFIED WHETHER COMPLICATED, UNSPECIFIED WHETHER PERSISTENT: ICD-10-CM

## 2024-08-16 DIAGNOSIS — Z87.442 HISTORY OF KIDNEY STONES: ICD-10-CM

## 2024-08-16 DIAGNOSIS — E11.22 TYPE 2 DIABETES MELLITUS WITH ESRD (END-STAGE RENAL DISEASE) (HCC): ICD-10-CM

## 2024-08-16 DIAGNOSIS — K21.9 GASTROESOPHAGEAL REFLUX DISEASE, UNSPECIFIED WHETHER ESOPHAGITIS PRESENT: ICD-10-CM

## 2024-08-16 DIAGNOSIS — L89.309 PRESSURE INJURY OF SKIN OF BUTTOCK, UNSPECIFIED INJURY STAGE, UNSPECIFIED LATERALITY: ICD-10-CM

## 2024-08-16 DIAGNOSIS — N18.4 CHRONIC KIDNEY DISEASE, STAGE IV (SEVERE) (HCC): ICD-10-CM

## 2024-08-16 DIAGNOSIS — E78.5 HYPERLIPIDEMIA, UNSPECIFIED HYPERLIPIDEMIA TYPE: ICD-10-CM
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
## 2024-08-16 DIAGNOSIS — Z86.711 HISTORY OF PULMONARY EMBOLUS (PE): ICD-10-CM

## 2024-08-16 DIAGNOSIS — E13.42 DIABETIC POLYNEUROPATHY ASSOCIATED WITH OTHER SPECIFIED DIABETES MELLITUS (HCC): ICD-10-CM

## 2024-08-16 DIAGNOSIS — N18.6 TYPE 2 DIABETES MELLITUS WITH ESRD (END-STAGE RENAL DISEASE) (HCC): ICD-10-CM

## 2024-08-16 DIAGNOSIS — Z87.39 HISTORY OF DEGENERATIVE DISC DISEASE: ICD-10-CM

## 2024-08-16 DIAGNOSIS — G47.00 INSOMNIA, UNSPECIFIED TYPE: ICD-10-CM

## 2024-08-16 DIAGNOSIS — Z86.79 HISTORY OF CONGESTIVE HEART FAILURE: ICD-10-CM

## 2024-08-16 DIAGNOSIS — E03.9 PRIMARY HYPOTHYROIDISM: ICD-10-CM

## 2024-08-16 DIAGNOSIS — F32.9 MAJOR DEPRESSIVE DISORDER WITH SINGLE EPISODE, REMISSION STATUS UNSPECIFIED: ICD-10-CM

## 2024-08-16 DIAGNOSIS — R32 URINARY INCONTINENCE, UNSPECIFIED TYPE: ICD-10-CM

## 2024-08-20 NOTE — PROGRESS NOTES
traZODone (DESYREL) 100 MG tablet Take 1.5 tablets by mouth nightly      acetaminophen (TYLENOL) 500 MG tablet Take 2 tablets by mouth 2 times daily And 2 tab by mouth every 24 hours prn pain      potassium chloride (MICRO-K) 10 MEQ CR capsule Take 1 capsule by mouth daily      levothyroxine (SYNTHROID) 150 MCG tablet Take 1 tablet by mouth Daily      metformin (GLUCOPHAGE) 500 MG tablet Take 1 tablet by mouth 2 times daily (with meals)      atorvastatin (LIPITOR) 10 MG tablet Take 1 tablet by mouth daily      hydrOXYzine (ATARAX) 25 MG tablet Take 1 tablet by mouth 3 times daily       No current facility-administered medications for this visit.         PHYSICAL EXAM:  Vital Signs: Pulse 69. Blood pressure 101/50. Respiratory rate 18.  Temperature 98.1.  SPO2 97% on room air. Weight 280.7 pounds.     Constitutional:   Generally looks well. She is sitting comfortably. Morbidly obese. No apparent distress.    Cardiovascular:   Regular rate and rhythm  Pulmonary:   Lungs are clear but distant.  Abdominal:  Markedly obese. Soft. No focal tenderness. Active bowel sounds.  Musculoskeletal:      Extremities well perfused, some trace edema near the ankles, nothing really pitting over the lower legs.  Psychiatric:  Alert, oriented. Able to answer most of the questions today.     No visits with results within 1 Month(s) from this visit.   Latest known visit with results is:   Orders Only on 06/06/2024   Component Date Value Ref Range Status    WBC 06/06/2024 7.0  4.8 - 10.8 Thou/mL3 Final    RBC 06/06/2024 4.87  4.20 - 5.40 M/uL Final    Hemoglobin 06/06/2024 12.5  12.0 - 16.0 Final    Hematocrit 06/06/2024 41.7  37.0 - 47.0 % Final    MCV 06/06/2024 85.6  80.0 - 94.0 fl Final    MCH 06/06/2024 25.7 (L)  28.5 - 32.5 pg Final    MCHC 06/06/2024 30.0 (L)  32.0 - 37.0 g/dL Final    RDW 06/06/2024 15.0  8.5 - 15.5 % Final    Platelets 06/06/2024 270.2  130 - 400 Thou/mm3 Final    Neutrophils % 06/06/2024 53.17  42.2 - 75.2

## 2024-09-10 ENCOUNTER — OUTSIDE SERVICES (OUTPATIENT)
Dept: INTERNAL MEDICINE | Age: 79
End: 2024-09-10

## 2024-09-10 DIAGNOSIS — Z00.00 ROUTINE PHYSICAL EXAMINATION: ICD-10-CM

## 2024-09-10 DIAGNOSIS — E03.9 HYPOTHYROIDISM, ADULT: ICD-10-CM

## 2024-09-10 DIAGNOSIS — F03.90 DEMENTIA, UNSPECIFIED DEMENTIA SEVERITY, UNSPECIFIED DEMENTIA TYPE, UNSPECIFIED WHETHER BEHAVIORAL, PSYCHOTIC, OR MOOD DISTURBANCE OR ANXIETY (HCC): Primary | ICD-10-CM

## 2024-09-10 DIAGNOSIS — N18.31 STAGE 3A CHRONIC KIDNEY DISEASE (HCC): ICD-10-CM

## 2024-09-10 DIAGNOSIS — E11.40 TYPE 2 DIABETES MELLITUS WITH DIABETIC NEUROPATHY, UNSPECIFIED WHETHER LONG TERM INSULIN USE (HCC): ICD-10-CM

## 2024-10-11 ENCOUNTER — OUTSIDE SERVICES (OUTPATIENT)
Dept: FAMILY MEDICINE CLINIC | Age: 79
End: 2024-10-11

## 2024-10-11 DIAGNOSIS — E11.22 TYPE 2 DIABETES MELLITUS WITH ESRD (END-STAGE RENAL DISEASE) (HCC): ICD-10-CM

## 2024-10-11 DIAGNOSIS — E78.5 HYPERLIPIDEMIA, UNSPECIFIED HYPERLIPIDEMIA TYPE: ICD-10-CM

## 2024-10-11 DIAGNOSIS — E13.42 DIABETIC POLYNEUROPATHY ASSOCIATED WITH OTHER SPECIFIED DIABETES MELLITUS (HCC): ICD-10-CM

## 2024-10-11 DIAGNOSIS — N18.6 TYPE 2 DIABETES MELLITUS WITH ESRD (END-STAGE RENAL DISEASE) (HCC): ICD-10-CM

## 2024-10-11 DIAGNOSIS — G47.00 INSOMNIA, UNSPECIFIED TYPE: ICD-10-CM
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
## 2024-10-11 DIAGNOSIS — J45.909 ASTHMA, UNSPECIFIED ASTHMA SEVERITY, UNSPECIFIED WHETHER COMPLICATED, UNSPECIFIED WHETHER PERSISTENT: ICD-10-CM

## 2024-10-11 DIAGNOSIS — Z87.39 HISTORY OF DEGENERATIVE DISC DISEASE: ICD-10-CM

## 2024-10-11 DIAGNOSIS — N18.4 CHRONIC KIDNEY DISEASE, STAGE IV (SEVERE) (HCC): ICD-10-CM

## 2024-10-11 DIAGNOSIS — K21.9 GASTROESOPHAGEAL REFLUX DISEASE, UNSPECIFIED WHETHER ESOPHAGITIS PRESENT: ICD-10-CM

## 2024-10-11 DIAGNOSIS — F32.9 MAJOR DEPRESSIVE DISORDER WITH SINGLE EPISODE, REMISSION STATUS UNSPECIFIED: ICD-10-CM

## 2024-10-11 DIAGNOSIS — E03.9 PRIMARY HYPOTHYROIDISM: ICD-10-CM

## 2024-10-11 DIAGNOSIS — R32 URINARY INCONTINENCE, UNSPECIFIED TYPE: ICD-10-CM

## 2024-10-11 DIAGNOSIS — Z86.79 HISTORY OF CONGESTIVE HEART FAILURE: ICD-10-CM

## 2024-10-11 DIAGNOSIS — Z87.442 HISTORY OF KIDNEY STONES: ICD-10-CM

## 2024-10-11 DIAGNOSIS — L98.1 DERMATITIS FACTITIA: ICD-10-CM

## 2024-10-11 DIAGNOSIS — Z86.711 HISTORY OF PULMONARY EMBOLUS (PE): ICD-10-CM

## 2024-10-15 NOTE — PROGRESS NOTES
6 hours as needed for Wheezing or Shortness of Breath      Azelastine HCl 137 MCG/SPRAY SOLN 2 sprays by Each Nostril route every morning      baclofen (LIORESAL) 10 MG tablet Take 1 tablet by mouth every morning      gabapentin (NEURONTIN) 250 MG/5ML solution Take 2 mLs by mouth 3 times daily. (Patient not taking: Reported on 1/17/2024)      glucagon 1 MG injection Inject 1 mg into the muscle as needed (hypoglycemia if unresponsive and/or unable to swallow.) Notify MD that glucagon was given and re-check blood sugar.      glucose (GLUTOSE) 40 % GEL Take 37.5 mLs by mouth as needed (hypoglycemia if able to swallow)      furosemide (LASIX) 40 MG tablet Take 1 tablet by mouth every morning      nystatin (MYCOSTATIN) 584738 UNIT/GM powder Apply topically to folds every shift ([place]) for redness until resolved.      linagliptin (TRADJENTA) 5 MG tablet Take 1 tablet by mouth daily      busPIRone (BUSPAR) 15 MG tablet Take 15 mg by mouth 3 times daily      carbamide peroxide (DEBROX) 6.5 % otic solution Place 4 drops into both ears as needed (cerumen buildup)      bisacodyl (DULCOLAX) 10 MG suppository Place 1 suppository rectally daily as needed for Constipation (if Milk of Magnesia ineffective)      lamoTRIgine (LAMICTAL) 100 MG tablet Take 1 tablet by mouth 2 times daily      loratadine (CLARITIN) 10 MG capsule Take 1 capsule by mouth daily      magnesium hydroxide (MILK OF MAGNESIA) 400 MG/5ML suspension Take 30 mLs by mouth daily as needed for Constipation      pantoprazole (PROTONIX) 40 MG tablet Take 1 tablet by mouth daily as needed      traZODone (DESYREL) 100 MG tablet Take 1.5 tablets by mouth nightly      acetaminophen (TYLENOL) 500 MG tablet Take 2 tablets by mouth 2 times daily And 2 tab by mouth every 24 hours prn pain      potassium chloride (MICRO-K) 10 MEQ CR capsule Take 1 capsule by mouth daily      levothyroxine (SYNTHROID) 150 MCG tablet Take 1 tablet by mouth Daily      metformin (GLUCOPHAGE) 500

## 2024-10-17 RX ORDER — LIDOCAINE HCL 4% 4 G/100G
CREAM TOPICAL
COMMUNITY

## 2024-10-17 RX ORDER — GUAIFENESIN 600 MG/1
600 TABLET, EXTENDED RELEASE ORAL EVERY 12 HOURS PRN
COMMUNITY

## 2024-10-17 RX ORDER — GUAIFENESIN 200 MG/10ML
100 LIQUID ORAL EVERY 4 HOURS PRN
COMMUNITY

## 2024-10-17 RX ORDER — ALBUTEROL SULFATE 0.83 MG/ML
2.5 SOLUTION RESPIRATORY (INHALATION) EVERY 4 HOURS PRN
COMMUNITY

## 2024-10-17 RX ORDER — HYDROPHILIC CREAM
PASTE (GRAM) TOPICAL
COMMUNITY

## 2024-11-05 ENCOUNTER — OUTSIDE SERVICES (OUTPATIENT)
Dept: INTERNAL MEDICINE | Age: 79
End: 2024-11-05

## 2024-11-05 DIAGNOSIS — N18.31 STAGE 3A CHRONIC KIDNEY DISEASE (HCC): ICD-10-CM

## 2024-11-05 DIAGNOSIS — E11.40 TYPE 2 DIABETES MELLITUS WITH DIABETIC NEUROPATHY, UNSPECIFIED WHETHER LONG TERM INSULIN USE (HCC): ICD-10-CM

## 2024-11-05 DIAGNOSIS — E03.9 HYPOTHYROIDISM, ADULT: ICD-10-CM

## 2024-11-05 DIAGNOSIS — F03.90 DEMENTIA, UNSPECIFIED DEMENTIA SEVERITY, UNSPECIFIED DEMENTIA TYPE, UNSPECIFIED WHETHER BEHAVIORAL, PSYCHOTIC, OR MOOD DISTURBANCE OR ANXIETY (HCC): Primary | ICD-10-CM

## 2024-11-06 NOTE — PROGRESS NOTES
Chest:      Chest wall: No tenderness.   Abdominal:      General: Bowel sounds are normal. There is no distension.      Palpations: Abdomen is soft.      Tenderness: There is no abdominal tenderness.   Musculoskeletal:         General: No swelling.      Right lower leg: Edema present.      Left lower leg: Edema (Chronic +1 edema bilateral lower extremities, chronic discoloration to bilateral shins) present.   Skin:     General: Skin is warm and dry.      Capillary Refill: Capillary refill takes less than 2 seconds.      Coloration: Skin is not jaundiced or pale.      Findings: No rash.   Neurological:      General: No focal deficit present.      Mental Status: She is alert and oriented to person, place, and time.      Cranial Nerves: No cranial nerve deficit.      Sensory: No sensory deficit.      Coordination: Coordination normal.   Psychiatric:         Mood and Affect: Mood normal.         Behavior: Behavior normal.         Thought Content: Thought content normal.         Judgment: Judgment normal.         Vital signs: Temperature: 98 °F, blood pressure 143/76, pulse 76, respirations 18, SpO2 95% on room air, weight 274.8 pounds      ASSESSMENT/PLAN:  Fall. At her extended care in assisted living on December 31, 2023. Complicated by urinary tract infection present on admission. She had extensive spine instrumentation and fusion from T7 to L1. Still recovering slowly. She is not very ambitious with therapy at this time due to the pain. She is not wearing her hearing aids which makes it hard to communicate with her. She continues to have some bowel and bladder incontinence. Pain overall seems well controlled.  History of degenerative disc disease.  Hyperlipidemia. Plan to continue atorvastatin 10 milligrams a day. Will plan on updating labs if she is here long term.  History of congestive heart failure. Patient looks currently well balanced. There is no evidence of peripheral edema. She does have some small

## 2024-11-09 ASSESSMENT — ENCOUNTER SYMPTOMS: CONSTIPATION: 1

## 2024-11-11 LAB
ANION GAP SERPL CALCULATED.3IONS-SCNC: 8.6 MMOL/L (ref 3–11)
BUN BLDV-MCNC: 36 MG/DL (ref 7–17)
CALCIUM SERPL-MCNC: 8.8 MG/DL (ref 8.4–10.2)
CHLORIDE BLD-SCNC: 93 MMOL/L (ref 98–120)
CO2: 40 MMOL/L (ref 22–31)
CREAT SERPL-MCNC: 1.1 MG/DL (ref 0.5–1)
GFR, ESTIMATED: 50.9
GLUCOSE: 170 MG/DL (ref 65–105)
POTASSIUM SERPL-SCNC: 3.6 MMOL/L (ref 3.6–5)
SODIUM BLD-SCNC: 138 MMOL/L (ref 135–145)

## 2024-12-12 LAB
ANION GAP SERPL CALCULATED.3IONS-SCNC: 6.2 MMOL/L (ref 3–11)
BUN BLDV-MCNC: 31 MG/DL (ref 7–17)
CALCIUM SERPL-MCNC: 9.1 MG/DL (ref 8.4–10.2)
CHLORIDE BLD-SCNC: 95 MMOL/L (ref 98–120)
CO2: 39 MMOL/L (ref 22–31)
CREAT SERPL-MCNC: 1 MG/DL (ref 0.5–1)
GFR, ESTIMATED: 56.8
GLUCOSE: 164 MG/DL (ref 65–105)
HBA1C MFR BLD: 7.8 % (ref 4.4–6.4)
POTASSIUM SERPL-SCNC: 4.2 MMOL/L (ref 3.6–5)
SODIUM BLD-SCNC: 136 MMOL/L (ref 135–145)
T4 FREE: 1.49 NG/DL (ref 0.78–2.19)
TSH SERPL DL<=0.05 MIU/L-ACNC: 3.06 MIU/ML (ref 0.49–4.67)

## 2024-12-13 ENCOUNTER — OUTSIDE SERVICES (OUTPATIENT)
Dept: FAMILY MEDICINE CLINIC | Age: 79
End: 2024-12-13

## 2024-12-13 DIAGNOSIS — Z87.442 HISTORY OF KIDNEY STONES: ICD-10-CM

## 2024-12-13 DIAGNOSIS — R32 URINARY INCONTINENCE, UNSPECIFIED TYPE: ICD-10-CM

## 2024-12-13 DIAGNOSIS — L89.309 PRESSURE INJURY OF SKIN OF BUTTOCK, UNSPECIFIED INJURY STAGE, UNSPECIFIED LATERALITY: ICD-10-CM

## 2024-12-13 DIAGNOSIS — E03.9 PRIMARY HYPOTHYROIDISM: ICD-10-CM
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
## 2024-12-13 DIAGNOSIS — F32.9 MAJOR DEPRESSIVE DISORDER WITH SINGLE EPISODE, REMISSION STATUS UNSPECIFIED: ICD-10-CM

## 2024-12-13 DIAGNOSIS — Z86.711 HISTORY OF PULMONARY EMBOLUS (PE): ICD-10-CM

## 2024-12-13 DIAGNOSIS — N18.6 TYPE 2 DIABETES MELLITUS WITH ESRD (END-STAGE RENAL DISEASE) (HCC): ICD-10-CM

## 2024-12-13 DIAGNOSIS — E13.42 DIABETIC POLYNEUROPATHY ASSOCIATED WITH OTHER SPECIFIED DIABETES MELLITUS (HCC): ICD-10-CM

## 2024-12-13 DIAGNOSIS — E11.22 TYPE 2 DIABETES MELLITUS WITH ESRD (END-STAGE RENAL DISEASE) (HCC): ICD-10-CM

## 2024-12-13 DIAGNOSIS — Z86.79 HISTORY OF CONGESTIVE HEART FAILURE: ICD-10-CM

## 2024-12-13 DIAGNOSIS — J45.909 ASTHMA, UNSPECIFIED ASTHMA SEVERITY, UNSPECIFIED WHETHER COMPLICATED, UNSPECIFIED WHETHER PERSISTENT: ICD-10-CM

## 2024-12-13 DIAGNOSIS — G47.00 INSOMNIA, UNSPECIFIED TYPE: ICD-10-CM

## 2024-12-13 DIAGNOSIS — E78.5 HYPERLIPIDEMIA, UNSPECIFIED HYPERLIPIDEMIA TYPE: ICD-10-CM

## 2024-12-13 DIAGNOSIS — L98.1 DERMATITIS FACTITIA: ICD-10-CM

## 2024-12-13 DIAGNOSIS — Z87.39 HISTORY OF DEGENERATIVE DISC DISEASE: ICD-10-CM

## 2024-12-13 DIAGNOSIS — N18.4 CHRONIC KIDNEY DISEASE, STAGE IV (SEVERE) (HCC): ICD-10-CM

## 2024-12-13 DIAGNOSIS — K21.9 GASTROESOPHAGEAL REFLUX DISEASE, UNSPECIFIED WHETHER ESOPHAGITIS PRESENT: ICD-10-CM

## 2024-12-16 NOTE — PROGRESS NOTES
[facility]  Date of Service:  12/13/2024  Mariluz Koenig  Date of Birth 1945  MRN: <[id number]>  Code Status----FULL CODE       HPI:  This is a 78-year-old female who was previously living in assisted living here at [facility]. She had a fall December 31, 2023, within her room area. She had back pain, eventually leading to a diagnosis of a T10 burst fracture. She also had urinary tract infection present on the admission. She underwent a T7 to L1 instrumented fusion with stabilization of the T10 burst fracture. She had some complications with post-op care, with some acute on chronic urinary incontinence with urinary tract infection being treated during this hospitalization. Cognition has been a little variable, her initial week after surgery was complicated by pain and pain medications, currently behaviors improving. She is still not as engaged with therapy and tends to report pain and early fatigue. No significant fever chills. Wound status reported to be OK. Incidental finding of some small bilateral pleural effusions on her most recent x-ray slight left basilar opacity. At the time of her admission, she had tested COVID positive. Suspicion that most of that disease was over prior to her admission and does have some residual lung findings as above, but not currently require any supplemental oxygen.     Over the Interval:  Patient is being followed for monthly rounds at [facility]. She reports she overall is doing well; she feels her weight is going up slightly, she has a left gluteal area wound described as being followed by nursing in wound care. She reports the elastic on the brief is sometimes scratching or hurting her leg, she reports she is fairly comfortable at rest, no other acute concerns.    LABS:  12/12/2024  TSH 3.05  Free T4 1.49  Hemoglobin A1C 7.8%  Sodium 136  Potassium 4.2  BUN 31  Creatinine 1    6-6-2024  Vitamin D 51.9  TSH 2.51  Free T4

## 2025-01-14 ENCOUNTER — OUTSIDE SERVICES (OUTPATIENT)
Dept: INTERNAL MEDICINE | Age: 80
End: 2025-01-14

## 2025-01-14 VITALS
OXYGEN SATURATION: 97 % | HEART RATE: 78 BPM | TEMPERATURE: 97.8 F | WEIGHT: 272.1 LBS | SYSTOLIC BLOOD PRESSURE: 145 MMHG | RESPIRATION RATE: 20 BRPM | DIASTOLIC BLOOD PRESSURE: 59 MMHG

## 2025-01-14 DIAGNOSIS — M79.675 GREAT TOE PAIN, LEFT: ICD-10-CM

## 2025-01-14 DIAGNOSIS — H93.8X1 EAR LUMP, RIGHT: ICD-10-CM

## 2025-01-14 DIAGNOSIS — M25.512 CHRONIC LEFT SHOULDER PAIN: ICD-10-CM

## 2025-01-14 DIAGNOSIS — F03.90 DEMENTIA, UNSPECIFIED DEMENTIA SEVERITY, UNSPECIFIED DEMENTIA TYPE, UNSPECIFIED WHETHER BEHAVIORAL, PSYCHOTIC, OR MOOD DISTURBANCE OR ANXIETY (HCC): ICD-10-CM

## 2025-01-14 DIAGNOSIS — E03.9 HYPOTHYROIDISM, ADULT: ICD-10-CM

## 2025-01-14 DIAGNOSIS — Z00.00 ROUTINE GENERAL MEDICAL EXAMINATION AT A HEALTH CARE FACILITY: Primary | ICD-10-CM

## 2025-01-14 DIAGNOSIS — E11.40 TYPE 2 DIABETES MELLITUS WITH DIABETIC NEUROPATHY, UNSPECIFIED WHETHER LONG TERM INSULIN USE (HCC): ICD-10-CM

## 2025-01-14 DIAGNOSIS — N18.31 STAGE 3A CHRONIC KIDNEY DISEASE (HCC): ICD-10-CM

## 2025-01-14 DIAGNOSIS — G89.29 CHRONIC LEFT SHOULDER PAIN: ICD-10-CM

## 2025-01-14 ASSESSMENT — PATIENT HEALTH QUESTIONNAIRE - PHQ9
SUM OF ALL RESPONSES TO PHQ QUESTIONS 1-9: 3
8. MOVING OR SPEAKING SO SLOWLY THAT OTHER PEOPLE COULD HAVE NOTICED. OR THE OPPOSITE, BEING SO FIGETY OR RESTLESS THAT YOU HAVE BEEN MOVING AROUND A LOT MORE THAN USUAL: NOT AT ALL
3. TROUBLE FALLING OR STAYING ASLEEP: NOT AT ALL
SUM OF ALL RESPONSES TO PHQ QUESTIONS 1-9: 3
10. IF YOU CHECKED OFF ANY PROBLEMS, HOW DIFFICULT HAVE THESE PROBLEMS MADE IT FOR YOU TO DO YOUR WORK, TAKE CARE OF THINGS AT HOME, OR GET ALONG WITH OTHER PEOPLE: SOMEWHAT DIFFICULT
2. FEELING DOWN, DEPRESSED OR HOPELESS: SEVERAL DAYS
6. FEELING BAD ABOUT YOURSELF - OR THAT YOU ARE A FAILURE OR HAVE LET YOURSELF OR YOUR FAMILY DOWN: NOT AT ALL
7. TROUBLE CONCENTRATING ON THINGS, SUCH AS READING THE NEWSPAPER OR WATCHING TELEVISION: NOT AT ALL
4. FEELING TIRED OR HAVING LITTLE ENERGY: SEVERAL DAYS
SUM OF ALL RESPONSES TO PHQ QUESTIONS 1-9: 3
SUM OF ALL RESPONSES TO PHQ QUESTIONS 1-9: 3
1. LITTLE INTEREST OR PLEASURE IN DOING THINGS: SEVERAL DAYS
5. POOR APPETITE OR OVEREATING: NOT AT ALL
9. THOUGHTS THAT YOU WOULD BE BETTER OFF DEAD, OR OF HURTING YOURSELF: NOT AT ALL
SUM OF ALL RESPONSES TO PHQ9 QUESTIONS 1 & 2: 2

## 2025-01-15 NOTE — PROGRESS NOTES
[facility]  Date of Service: 01/14/25  Mariluz Koenig  Date of Birth 1945  Code Status----FULL CODE         HPI:  This is a 78-year-old female who was previously living in assisted living here at [facility]. She had a fall December 31, 2023, within her room area. She had back pain, eventually leading to a diagnosis of a T10 burst fracture. She also had urinary tract infection present on the admission. She underwent a T7 to L1 instrumented fusion with stabilization of the T10 burst fracture. She had some complications with post-op care, with some acute on chronic urinary incontinence with urinary tract infection being treated during this hospitalization. Cognition has been a little variable, her initial week after surgery was complicated by pain and pain medications, currently behaviors improving. She is still not as engaged with therapy and tends to report pain and early fatigue. No significant fever chills. Wound status reported to be OK. Incidental finding of some small bilateral pleural effusions on her most recent x-ray slight left basilar opacity. At the time of her admission, she had tested COVID positive. Suspicion that most of that disease was over prior to her admission and does have some residual lung findings as above, but not currently require any supplemental oxygen.     Over the Interval:  Patient is being followed for monthly rounds at [facility], annual wellness visit, complains of left great toe pain, right knee pain and concern that nursing staff found a large bump to find her right ear.  They did put warm compresses on last night as she was complaining of pain to the area that has since subsided.  Patient wondering if it is where her hearing aids rub.  She has had some increased sugars.  Noncompliant with diet.  Very little mobility.  States sits in her chair most of the day sleeps in her chair all night.  Anytime she gets up she has right knee 
Place 1 suppository rectally daily as needed for Constipation (if Milk of Magnesia ineffective)  Ovi Bronson MD   lamoTRIgine (LAMICTAL) 100 MG tablet Take 1 tablet by mouth 2 times daily  Ovi Bronson MD   loratadine (CLARITIN) 10 MG capsule Take 1 capsule by mouth daily  Ovi Bronson MD   magnesium hydroxide (MILK OF MAGNESIA) 400 MG/5ML suspension Take 30 mLs by mouth daily as needed for Constipation  Ovi Bronson MD   pantoprazole (PROTONIX) 40 MG tablet Take 1 tablet by mouth daily as needed  Ovi Bronson MD   traZODone (DESYREL) 100 MG tablet Take 1 tablet by mouth nightly  Ovi Bronson MD   acetaminophen (TYLENOL) 500 MG tablet Take 2 tablets by mouth 2 times daily And 2 tab by mouth every 24 hours prn pain  Ovi Bronson MD   potassium chloride (MICRO-K) 10 MEQ CR capsule Take 1 capsule by mouth daily  Ovi Bronson MD   levothyroxine (SYNTHROID) 150 MCG tablet Take 1 tablet by mouth Daily  Ovi Bronson MD   metformin (GLUCOPHAGE) 500 MG tablet Take 1 tablet by mouth 2 times daily (with meals)  Ovi Bronson MD   atorvastatin (LIPITOR) 10 MG tablet Take 1 tablet by mouth daily  Ovi Bronson MD   hydrOXYzine (ATARAX) 25 MG tablet Take 2 tablets by mouth 3 times daily  Provider, Historical, MD       CareTeam (Including outside providers/suppliers regularly involved in providing care):   Patient Care Team:  Yaa Cummings APRN - CNP as PCP - General     Recommendations for Preventive Services Due: see orders and patient instructions/AVS.  Recommended screening schedule for the next 5-10 years is provided to the patient in written form: see Patient Instructions/AVS.     Reviewed and updated this visit:

## 2025-01-15 NOTE — PATIENT INSTRUCTIONS
when sidewalks are slippery. Use de-icer on steps and walkways. Add non-slip devices to shoes.    Put grab bars and nonskid mats in your shower or tub and near the toilet. Try to use a shower chair or bath bench when bathing.   Get into a tub or shower by putting in your weaker leg first. Get out with your strong side first. Have a phone or medical alert device in the bathroom with you.   Where can you learn more?  Go to https://www.[employer].net/patientEd and enter G117 to learn more about \"Preventing Falls: Care Instructions.\"  Current as of: July 31, 2024  Content Version: 14.3  © 2024 [email or web address].   Care instructions adapted under license by [employer]. If you have questions about a medical condition or this instruction, always ask your healthcare professional. [email or web address], disclaims any warranty or liability for your use of this information.         Learning About Being Active as an Older Adult  Why is being active important as you get older?     Being active is one of the best things you can do for your health. And it's never too late to start. Being active--or getting active, if you aren't already--has definite benefits. It can:  Give you more energy,  Keep your mind sharp.  Improve balance to reduce your risk of falls.  Help you manage chronic illness with fewer medicines.  No matter how old you are, how fit you are, or what health problems you have, there is a form of activity that will work for you. And the more physical activity you can do, the better your overall health will be.  What kinds of activity can help you stay healthy?  Being more active will make your daily activities easier. Physical activity includes planned exercise and things you do in daily life. There are four types of activity:  Aerobic.  Doing aerobic activity makes your heart and lungs strong.  Includes walking, dancing, and gardening.  Aim for at least 2½ hours spread throughout the week.  It improves your

## 2025-02-07 ENCOUNTER — OUTSIDE SERVICES (OUTPATIENT)
Dept: FAMILY MEDICINE CLINIC | Age: 80
End: 2025-02-07
Payer: MEDICARE

## 2025-02-07 DIAGNOSIS — Z86.79 HISTORY OF CONGESTIVE HEART FAILURE: ICD-10-CM

## 2025-02-07 DIAGNOSIS — E03.9 PRIMARY HYPOTHYROIDISM: ICD-10-CM

## 2025-02-07 DIAGNOSIS — E13.42 DIABETIC POLYNEUROPATHY ASSOCIATED WITH OTHER SPECIFIED DIABETES MELLITUS (HCC): ICD-10-CM
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
## 2025-02-07 DIAGNOSIS — J45.909 ASTHMA, UNSPECIFIED ASTHMA SEVERITY, UNSPECIFIED WHETHER COMPLICATED, UNSPECIFIED WHETHER PERSISTENT: ICD-10-CM

## 2025-02-07 DIAGNOSIS — L89.309 PRESSURE INJURY OF SKIN OF BUTTOCK, UNSPECIFIED INJURY STAGE, UNSPECIFIED LATERALITY: ICD-10-CM

## 2025-02-07 DIAGNOSIS — N18.6 TYPE 2 DIABETES MELLITUS WITH ESRD (END-STAGE RENAL DISEASE) (HCC): ICD-10-CM

## 2025-02-07 DIAGNOSIS — L98.1 DERMATITIS FACTITIA: ICD-10-CM

## 2025-02-07 DIAGNOSIS — E78.5 HYPERLIPIDEMIA, UNSPECIFIED HYPERLIPIDEMIA TYPE: ICD-10-CM

## 2025-02-07 DIAGNOSIS — N18.4 CHRONIC KIDNEY DISEASE, STAGE IV (SEVERE) (HCC): ICD-10-CM

## 2025-02-07 DIAGNOSIS — F32.9 MAJOR DEPRESSIVE DISORDER WITH SINGLE EPISODE, REMISSION STATUS UNSPECIFIED: ICD-10-CM

## 2025-02-07 DIAGNOSIS — Z86.711 HISTORY OF PULMONARY EMBOLUS (PE): ICD-10-CM

## 2025-02-07 DIAGNOSIS — Z87.442 HISTORY OF KIDNEY STONES: ICD-10-CM

## 2025-02-07 DIAGNOSIS — G47.00 INSOMNIA, UNSPECIFIED TYPE: ICD-10-CM

## 2025-02-07 DIAGNOSIS — Z87.39 HISTORY OF DEGENERATIVE DISC DISEASE: ICD-10-CM

## 2025-02-07 DIAGNOSIS — E11.22 TYPE 2 DIABETES MELLITUS WITH ESRD (END-STAGE RENAL DISEASE) (HCC): ICD-10-CM

## 2025-02-07 DIAGNOSIS — R32 URINARY INCONTINENCE, UNSPECIFIED TYPE: ICD-10-CM

## 2025-02-07 DIAGNOSIS — K21.9 GASTROESOPHAGEAL REFLUX DISEASE, UNSPECIFIED WHETHER ESOPHAGITIS PRESENT: ICD-10-CM

## 2025-02-07 PROCEDURE — 99309 SBSQ NF CARE MODERATE MDM 30: CPT | Performed by: FAMILY MEDICINE

## 2025-02-11 NOTE — PROGRESS NOTES
[facility]  Date of Service:  2/7/2025  Mariluz Koenig  Date of Birth 1945  MRN: <[id number]>  Code Status----FULL CODE       HPI:  This is a 78-year-old female who was previously living in assisted living here at [facility]. She had a fall December 31, 2023, within her room area. She had back pain, eventually leading to a diagnosis of a T10 burst fracture. She also had urinary tract infection present on the admission. She underwent a T7 to L1 instrumented fusion with stabilization of the T10 burst fracture. She had some complications with post-op care, with some acute on chronic urinary incontinence with urinary tract infection being treated during this hospitalization. Cognition has been a little variable, her initial week after surgery was complicated by pain and pain medications, currently behaviors improving. She is still not as engaged with therapy and tends to report pain and early fatigue. No significant fever chills. Wound status reported to be OK. Incidental finding of some small bilateral pleural effusions on her most recent x-ray slight left basilar opacity. At the time of her admission, she had tested COVID positive. Suspicion that most of that disease was over prior to her admission and does have some residual lung findings as above, but not currently require any supplemental oxygen.     Over the Interval:  Patient being seen for monthly rounds at [facility]. She reports she is doing fairly well; she is frustrated by a more recent skin issue dealing with the elastic on her briefs, she previously had one on the left gluteal area that is now resolved, currently she has a smaller spot on the right groin area. The briefs often rubbing, she is trying to get the girls to understand the situation and put more of the pressure on the other side, trying to adjust the fit a little bit to her favor she does not feel the staff adequately understand what she is talking

## 2025-03-11 ENCOUNTER — OUTSIDE SERVICES (OUTPATIENT)
Dept: INTERNAL MEDICINE | Age: 80
End: 2025-03-11

## 2025-03-11 DIAGNOSIS — E11.40 TYPE 2 DIABETES MELLITUS WITH DIABETIC NEUROPATHY, WITHOUT LONG-TERM CURRENT USE OF INSULIN (HCC): ICD-10-CM

## 2025-03-11 DIAGNOSIS — F03.90 DEMENTIA, UNSPECIFIED DEMENTIA SEVERITY, UNSPECIFIED DEMENTIA TYPE, UNSPECIFIED WHETHER BEHAVIORAL, PSYCHOTIC, OR MOOD DISTURBANCE OR ANXIETY (HCC): ICD-10-CM

## 2025-03-11 DIAGNOSIS — E03.9 HYPOTHYROIDISM, ADULT: ICD-10-CM

## 2025-03-11 DIAGNOSIS — G47.00 INSOMNIA, UNSPECIFIED TYPE: ICD-10-CM

## 2025-03-11 DIAGNOSIS — N18.31 STAGE 3A CHRONIC KIDNEY DISEASE (HCC): Primary | ICD-10-CM

## 2025-03-12 NOTE — PROGRESS NOTES
[facility]  Date of Service: 03/11/25  Mariluz Koenig  Date of Birth 1945  Code Status----FULL CODE         HPI:  This is a 78-year-old female who was previously living in assisted living here at [facility]. She had a fall December 31, 2023, within her room area. She had back pain, eventually leading to a diagnosis of a T10 burst fracture. She also had urinary tract infection present on the admission. She underwent a T7 to L1 instrumented fusion with stabilization of the T10 burst fracture. She had some complications with post-op care, with some acute on chronic urinary incontinence with urinary tract infection being treated during this hospitalization. Cognition has been a little variable, her initial week after surgery was complicated by pain and pain medications, currently behaviors improving. She is still not as engaged with therapy and tends to report pain and early fatigue. No significant fever chills. Wound status reported to be OK. Incidental finding of some small bilateral pleural effusions on her most recent x-ray slight left basilar opacity. At the time of her admission, she had tested COVID positive. Suspicion that most of that disease was over prior to her admission and does have some residual lung findings as above, but not currently require any supplemental oxygen.     Over the Interval:  Patient being seen for monthly rounds at [facility].  Patient is asleep in bed.  Awakens to verbal stimuli.  Denies any acute concerns.  Cooperative with exam.  Falls back asleep quickly.  Nursing staff deny any acute nursing service issues.  States blood sugars are occasionally elevated in the morning but usually not fasting.  Her vitals have been stable.  Patient denies any acute pain.  Does continue to require the assistance of the staff for all ADLs     LABS:  12/12/2024  TSH 3.05  Free T4 1.49  Hemoglobin A1C 7.8%  Sodium 136  Potassium 4.2  BUN 31  Creatinine

## 2025-04-18 ENCOUNTER — OUTSIDE SERVICES (OUTPATIENT)
Dept: FAMILY MEDICINE CLINIC | Age: 80
End: 2025-04-18
Payer: MEDICARE

## 2025-04-18 DIAGNOSIS — Z87.39 HISTORY OF DEGENERATIVE DISC DISEASE: ICD-10-CM

## 2025-04-18 DIAGNOSIS — Z87.442 HISTORY OF KIDNEY STONES: ICD-10-CM

## 2025-04-18 DIAGNOSIS — K21.9 GASTROESOPHAGEAL REFLUX DISEASE, UNSPECIFIED WHETHER ESOPHAGITIS PRESENT: ICD-10-CM

## 2025-04-18 DIAGNOSIS — Z86.711 HISTORY OF PULMONARY EMBOLUS (PE): ICD-10-CM
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
## 2025-04-18 DIAGNOSIS — Z86.79 HISTORY OF CONGESTIVE HEART FAILURE: ICD-10-CM

## 2025-04-18 DIAGNOSIS — E78.5 HYPERLIPIDEMIA, UNSPECIFIED HYPERLIPIDEMIA TYPE: ICD-10-CM

## 2025-04-18 DIAGNOSIS — L98.1 DERMATITIS FACTITIA: ICD-10-CM

## 2025-04-18 DIAGNOSIS — E13.42 DIABETIC POLYNEUROPATHY ASSOCIATED WITH OTHER SPECIFIED DIABETES MELLITUS (HCC): ICD-10-CM

## 2025-04-18 DIAGNOSIS — E03.9 PRIMARY HYPOTHYROIDISM: ICD-10-CM

## 2025-04-18 DIAGNOSIS — F32.9 MAJOR DEPRESSIVE DISORDER WITH SINGLE EPISODE, REMISSION STATUS UNSPECIFIED: ICD-10-CM

## 2025-04-18 DIAGNOSIS — G47.00 INSOMNIA, UNSPECIFIED TYPE: ICD-10-CM

## 2025-04-18 DIAGNOSIS — E11.22 TYPE 2 DIABETES MELLITUS WITH ESRD (END-STAGE RENAL DISEASE) (HCC): ICD-10-CM

## 2025-04-18 DIAGNOSIS — N18.6 TYPE 2 DIABETES MELLITUS WITH ESRD (END-STAGE RENAL DISEASE) (HCC): ICD-10-CM

## 2025-04-18 DIAGNOSIS — J45.909 ASTHMA, UNSPECIFIED ASTHMA SEVERITY, UNSPECIFIED WHETHER COMPLICATED, UNSPECIFIED WHETHER PERSISTENT: ICD-10-CM

## 2025-04-18 DIAGNOSIS — N18.4 CHRONIC KIDNEY DISEASE, STAGE IV (SEVERE) (HCC): ICD-10-CM

## 2025-04-18 DIAGNOSIS — R32 URINARY INCONTINENCE, UNSPECIFIED TYPE: ICD-10-CM

## 2025-04-18 DIAGNOSIS — L89.309 PRESSURE INJURY OF SKIN OF BUTTOCK, UNSPECIFIED INJURY STAGE, UNSPECIFIED LATERALITY: ICD-10-CM

## 2025-04-18 PROCEDURE — 99309 SBSQ NF CARE MODERATE MDM 30: CPT | Performed by: FAMILY MEDICINE

## 2025-04-24 NOTE — PROGRESS NOTES
prior.         ISally, am personally transcribing for José Miguel Sexton MD 4/24/25 at 3:16 PM EDT.        I, José Miguel Sexton MD, personally performed the services described in this document as transcribed by the , and it is both accurate and complete.    Electronically signed by José Miguel Sexton MD on 4/28/25 at 1:09 PM EDT

## 2025-05-27 ENCOUNTER — OUTSIDE SERVICES (OUTPATIENT)
Dept: INTERNAL MEDICINE | Age: 80
End: 2025-05-27

## 2025-05-27 DIAGNOSIS — N18.31 STAGE 3A CHRONIC KIDNEY DISEASE (HCC): Primary | ICD-10-CM

## 2025-05-27 DIAGNOSIS — E03.9 HYPOTHYROIDISM, ADULT: ICD-10-CM

## 2025-05-27 DIAGNOSIS — E11.40 TYPE 2 DIABETES MELLITUS WITH DIABETIC NEUROPATHY, WITHOUT LONG-TERM CURRENT USE OF INSULIN (HCC): ICD-10-CM

## 2025-05-27 DIAGNOSIS — F03.90 DEMENTIA, UNSPECIFIED DEMENTIA SEVERITY, UNSPECIFIED DEMENTIA TYPE, UNSPECIFIED WHETHER BEHAVIORAL, PSYCHOTIC, OR MOOD DISTURBANCE OR ANXIETY (HCC): ICD-10-CM

## 2025-05-27 NOTE — PROGRESS NOTES
Exercise per Week: 0 days     Minutes of Exercise per Session: 0 min   Stress: Not on file   Social Connections: Not on file   Intimate Partner Violence: Not At Risk (10/29/2024)    Received from [facility]    Humiliation, Afraid, Rape, and Kick questionnaire     Fear of Current or Ex-Partner: No     Emotionally Abused: No     Physically Abused: No     Sexually Abused: No   Housing Stability: Low Risk  (9/19/2024)    Received from [facility]    Housing Stability Vital Sign     In the last 12 months, was there a time when you were not able to pay the mortgage or rent on time?: No     Number of Times Moved in the Last Year: Not on file     Homeless in the Last Year: Not on file       Current Outpatient Medications on File Prior to Visit   Medication Sig Dispense Refill    diclofenac sodium (VOLTAREN) 1 % GEL Apply topically 2 times daily (To both knees)      Spironolactone (ALDACTONE PO) Take 12.5 mg by mouth daily      LOSARTAN POTASSIUM PO Take 25 mg by mouth daily      albuterol (PROVENTIL) (2.5 MG/3ML) 0.083% nebulizer solution Take 3 mLs by nebulization every 4 hours as needed for Wheezing or Shortness of Breath      Lidocaine HCl (ASPERCREME LIDOCAINE) 4 % CREA Apply to knees topically as needed for pain      guaiFENesin (ROBITUSSIN) 100 MG/5ML liquid Take 5 mLs by mouth every 4 hours as needed for Congestion      guaiFENesin (MUCINEX) 600 MG extended release tablet Take 1 tablet by mouth every 12 hours as needed for Congestion      Melatonin 3 MG CAPS Take 6 mg by mouth at bedtime      Semaglutide (OZEMPIC, 1 MG/DOSE, SC) Inject 1 mg into the skin once a week      desvenlafaxine succinate (PRISTIQ) 100 MG TB24 extended release tablet Take 1 tablet by mouth daily      fluticasone-salmeterol (ADVAIR DISKUS) 250-50 MCG/ACT AEPB diskus inhaler Inhale 1 puff into the lungs daily      loperamide (IMODIUM) 2 MG capsule Take 1 capsule by mouth every 6 hours as needed for Diarrhea 1 tablet

## 2025-06-05 LAB
ALBUMIN/GLOBULIN RATIO: 1.3 G/DL
ALBUMIN: 3.7 G/DL (ref 3.5–5)
ALP BLD-CCNC: 56 UNITS/L (ref 38–126)
ALT SERPL-CCNC: 13 UNITS/L (ref 4–35)
ANION GAP SERPL CALCULATED.3IONS-SCNC: 13.2 MMOL/L (ref 3–11)
AST SERPL-CCNC: 19 UNITS/L (ref 14–36)
BASOPHILS ABSOLUTE: 0.06 X10E3/?L (ref 0–0.3)
BASOPHILS RELATIVE PERCENT: 0.91 % (ref 0–3)
BILIRUB SERPL-MCNC: 0.6 MG/DL (ref 0.2–1.3)
BUN BLDV-MCNC: 21 MG/DL (ref 7–17)
CALCIUM SERPL-MCNC: 8.8 MG/DL (ref 8.4–10.2)
CHLORIDE BLD-SCNC: 95 MMOL/L (ref 98–120)
CHOLESTEROL, TOTAL: 102 MG/DL (ref 50–200)
CHOLESTEROL/HDL RATIO: 2 RATIO (ref 0–4.5)
CO2: 34 MMOL/L (ref 22–31)
CREAT SERPL-MCNC: 1 MG/DL (ref 0.5–1)
EOSINOPHILS ABSOLUTE: 0.39 X10E3/?L (ref 0–1.1)
EOSINOPHILS RELATIVE PERCENT: 6.3 % (ref 0–10)
GFR, ESTIMATED: 56.7
GLOBULIN: 2.8 G/DL
GLUCOSE: 147 MG/DL (ref 65–105)
HBA1C MFR BLD: 7.3 % (ref 4.4–6.4)
HCT VFR BLD CALC: 35.4 % (ref 37–47)
HDLC SERPL-MCNC: 59 MG/DL (ref 36–68)
HEMOGLOBIN: 10.5 G/DL (ref 12–16)
LDL CHOLESTEROL: 27.8 MG/DL (ref 0–160)
LYMPHOCYTES ABSOLUTE: 2.62 X10E3/?L (ref 1–5.5)
LYMPHOCYTES RELATIVE PERCENT: 42.4 % (ref 20–51.1)
MCH RBC QN AUTO: 23.6 PG (ref 28.5–32.5)
MCHC RBC AUTO-ENTMCNC: 29.6 G/DL (ref 32–37)
MCV RBC AUTO: 79.7 FL (ref 80–94)
MONOCYTES ABSOLUTE: 0.52 X10E3/?L (ref 0.1–1)
MONOCYTES RELATIVE PERCENT: 8.42 % (ref 1.7–9.3)
NEUTROPHILS ABSOLUTE: 2.59 X10E3/?L (ref 2–8.1)
NEUTROPHILS RELATIVE PERCENT: 41.96 % (ref 42.2–75.2)
PDW BLD-RTO: 16.8 % (ref 8.5–15.5)
PLATELET # BLD: 267.5 THOU/MM3 (ref 130–400)
POTASSIUM SERPL-SCNC: 4.2 MMOL/L (ref 3.6–5)
RBC # BLD: 4.44 M/UL (ref 4.2–5.4)
SODIUM BLD-SCNC: 138 MMOL/L (ref 135–145)
T4 FREE: 1.84 NG/DL (ref 0.78–2.19)
TOTAL PROTEIN: 6.5 G/DL (ref 6.3–8.2)
TRIGL SERPL-MCNC: 76 MG/DL (ref 10–250)
TSH SERPL DL<=0.05 MIU/L-ACNC: 2.89 MIU/ML (ref 0.49–4.67)
VITAMIN D 25-HYDROXY: 49.2 NG/ML (ref 30–100)
VLDLC SERPL CALC-MCNC: 15 MG/DL (ref 0–50)
WBC # BLD: 6.2 THOU/ML3 (ref 4.8–10.8)

## 2025-06-09 LAB
ANION GAP SERPL CALCULATED.3IONS-SCNC: 14.5 MMOL/L (ref 3–11)
BUN BLDV-MCNC: 24 MG/DL (ref 7–17)
CALCIUM SERPL-MCNC: 9.1 MG/DL (ref 8.4–10.2)
CHLORIDE BLD-SCNC: 96 MMOL/L (ref 98–120)
CO2: 30 MMOL/L (ref 22–31)
CREAT SERPL-MCNC: 1.1 MG/DL (ref 0.5–1)
GFR, ESTIMATED: 50.8
GLUCOSE: 161 MG/DL (ref 65–105)
POTASSIUM SERPL-SCNC: 4.5 MMOL/L (ref 3.6–5)
SODIUM BLD-SCNC: 136 MMOL/L (ref 135–145)

## 2025-06-13 ENCOUNTER — OUTSIDE SERVICES (OUTPATIENT)
Dept: FAMILY MEDICINE CLINIC | Age: 80
End: 2025-06-13
Payer: MEDICARE

## 2025-06-13 DIAGNOSIS — E11.22 TYPE 2 DIABETES MELLITUS WITH ESRD (END-STAGE RENAL DISEASE) (HCC): ICD-10-CM

## 2025-06-13 DIAGNOSIS — Z87.39 HISTORY OF DEGENERATIVE DISC DISEASE: ICD-10-CM

## 2025-06-13 DIAGNOSIS — G47.00 INSOMNIA, UNSPECIFIED TYPE: ICD-10-CM

## 2025-06-13 DIAGNOSIS — L98.1 DERMATITIS FACTITIA: ICD-10-CM

## 2025-06-13 DIAGNOSIS — Z87.442 HISTORY OF KIDNEY STONES: ICD-10-CM

## 2025-06-13 DIAGNOSIS — E13.42 DIABETIC POLYNEUROPATHY ASSOCIATED WITH OTHER SPECIFIED DIABETES MELLITUS (HCC): ICD-10-CM

## 2025-06-13 DIAGNOSIS — F32.9 MAJOR DEPRESSIVE DISORDER WITH SINGLE EPISODE, REMISSION STATUS UNSPECIFIED: ICD-10-CM

## 2025-06-13 DIAGNOSIS — E78.5 HYPERLIPIDEMIA, UNSPECIFIED HYPERLIPIDEMIA TYPE: ICD-10-CM

## 2025-06-13 DIAGNOSIS — L03.116 CELLULITIS OF LEFT LOWER EXTREMITY: ICD-10-CM

## 2025-06-13 DIAGNOSIS — E03.9 PRIMARY HYPOTHYROIDISM: ICD-10-CM

## 2025-06-13 DIAGNOSIS — R32 URINARY INCONTINENCE, UNSPECIFIED TYPE: ICD-10-CM

## 2025-06-13 DIAGNOSIS — K21.9 GASTROESOPHAGEAL REFLUX DISEASE, UNSPECIFIED WHETHER ESOPHAGITIS PRESENT: ICD-10-CM

## 2025-06-13 DIAGNOSIS — N18.6 TYPE 2 DIABETES MELLITUS WITH ESRD (END-STAGE RENAL DISEASE) (HCC): ICD-10-CM

## 2025-06-13 DIAGNOSIS — N18.4 CHRONIC KIDNEY DISEASE, STAGE IV (SEVERE) (HCC): ICD-10-CM

## 2025-06-13 DIAGNOSIS — Z86.79 HISTORY OF CONGESTIVE HEART FAILURE: ICD-10-CM

## 2025-06-13 DIAGNOSIS — L89.309 PRESSURE INJURY OF SKIN OF BUTTOCK, UNSPECIFIED INJURY STAGE, UNSPECIFIED LATERALITY: ICD-10-CM

## 2025-06-13 DIAGNOSIS — J45.909 ASTHMA, UNSPECIFIED ASTHMA SEVERITY, UNSPECIFIED WHETHER COMPLICATED, UNSPECIFIED WHETHER PERSISTENT: ICD-10-CM
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
## 2025-06-13 DIAGNOSIS — Z86.711 HISTORY OF PULMONARY EMBOLUS (PE): ICD-10-CM

## 2025-06-13 PROCEDURE — 99309 SBSQ NF CARE MODERATE MDM 30: CPT | Performed by: FAMILY MEDICINE

## 2025-06-16 NOTE — PROGRESS NOTES
[facility]  Date of Service:  6/13/2025  Mariluz Koenig  Date of Birth 1945  MRN: <[id number]>  Code Status----FULL CODE         HPI:  This is a 78-year-old female who was previously living in assisted living here at [facility]. She had a fall December 31, 2023, within her room area. She had back pain, eventually leading to a diagnosis of a T10 burst fracture. She also had urinary tract infection present on the admission. She underwent a T7 to L1 instrumented fusion with stabilization of the T10 burst fracture. She had some complications with post-op care, with some acute on chronic urinary incontinence with urinary tract infection being treated during this hospitalization. Cognition has been a little variable, her initial week after surgery was complicated by pain and pain medications, currently behaviors improving. She is still not as engaged with therapy and tends to report pain and early fatigue. No significant fever chills. Wound status reported to be OK. Incidental finding of some small bilateral pleural effusions on her most recent x-ray slight left basilar opacity. At the time of her admission, she had tested COVID positive. Suspicion that most of that disease was over prior to her admission and does have some residual lung findings as above, but not currently require any supplemental oxygen.     Over the Interval:    She has been doing fairly well, but she has had some recurrent left lower extremity cellulitis and has started Augmentin for 10 days. She herself has no acute concerns.    LABS:  12/12/2024  TSH 3.05  Free T4 1.49  Hemoglobin A1C 7.8%  Sodium 136  Potassium 4.2  BUN 31  Creatinine 1     6-6-2024  Vitamin D 51.9  TSH 2.51  Free T4 1.86  Hemoglobin A1c 7.1  Triglycerides 169  Total cholesterol 131  LDL 47.2  HDL 58  Sodium 135  Potassium 4.5  BUN 22  Creatinine 0.9  GFR greater than 60  Glucose 165  Calcium 9.2  AST 18  ALT 14  WBC 7  Hemoglobin

## 2025-07-08 ENCOUNTER — OUTSIDE SERVICES (OUTPATIENT)
Dept: INTERNAL MEDICINE | Age: 80
End: 2025-07-08
Payer: MEDICARE

## 2025-07-08 DIAGNOSIS — N18.31 STAGE 3A CHRONIC KIDNEY DISEASE (HCC): ICD-10-CM

## 2025-07-08 DIAGNOSIS — E11.40 TYPE 2 DIABETES MELLITUS WITH DIABETIC NEUROPATHY, WITHOUT LONG-TERM CURRENT USE OF INSULIN (HCC): ICD-10-CM

## 2025-07-08 DIAGNOSIS — F03.90 DEMENTIA, UNSPECIFIED DEMENTIA SEVERITY, UNSPECIFIED DEMENTIA TYPE, UNSPECIFIED WHETHER BEHAVIORAL, PSYCHOTIC, OR MOOD DISTURBANCE OR ANXIETY (HCC): Primary | ICD-10-CM

## 2025-07-08 DIAGNOSIS — E03.9 HYPOTHYROIDISM, ADULT: ICD-10-CM

## 2025-07-08 PROCEDURE — 99309 SBSQ NF CARE MODERATE MDM 30: CPT | Performed by: NURSE PRACTITIONER

## 2025-07-09 NOTE — PROGRESS NOTES
[facility]  Date of Service: 07/08/25  Mariluz Koenig  Date of Birth 1945  Code Status----FULL CODE         HPI:  This is a 78-year-old female who was previously living in assisted living here at [facility]. She had a fall December 31, 2023, within her room area. She had back pain, eventually leading to a diagnosis of a T10 burst fracture. She also had urinary tract infection present on the admission. She underwent a T7 to L1 instrumented fusion with stabilization of the T10 burst fracture. She had some complications with post-op care, with some acute on chronic urinary incontinence with urinary tract infection being treated during this hospitalization. Cognition has been a little variable, her initial week after surgery was complicated by pain and pain medications, currently behaviors improving. She is still not as engaged with therapy and tends to report pain and early fatigue. No significant fever chills. Wound status reported to be OK. Incidental finding of some small bilateral pleural effusions on her most recent x-ray slight left basilar opacity. At the time of her admission, she had tested COVID positive. Suspicion that most of that disease was over prior to her admission and does have some residual lung findings as above, but not currently require any supplemental oxygen.     Over the Interval:  Pleasant 80-year-old female being seen in bed for routine monthly rounds.  Patient asleep on arrival.  Awakens to gentle shake.  Denies any significant new pain or concerns.  States she has going to the dining room 2 out of the 3 meals a day.  Mood has been stable.  Continues to follow with psychiatry services.  States has a good appetite.  No problems with bowels or bladder.  Nursing staff deny any acute nursing service issues.     LABS:  6/9/2025  Sodium 136  Potassium 4.5  BUN 24  Creatinine 1.1  GFR 50.8  Glucose 161  Calcium 9.1    6/5/2025  Free T41.84  TSH 2.89  Vitamin D

## 2025-08-13 LAB
ALBUMIN/GLOBULIN RATIO: 1.4 G/DL
ALBUMIN: 3.6 G/DL (ref 3.5–5)
ALP BLD-CCNC: 60 UNITS/L (ref 38–126)
ALT SERPL-CCNC: 11 UNITS/L (ref 4–35)
ANION GAP SERPL CALCULATED.3IONS-SCNC: 10.6 MMOL/L (ref 3–11)
AST SERPL-CCNC: 17 UNITS/L (ref 14–36)
BILIRUB SERPL-MCNC: 0.5 MG/DL (ref 0.2–1.3)
BUN BLDV-MCNC: 24 MG/DL (ref 7–17)
CALCIUM SERPL-MCNC: 8.7 MG/DL (ref 8.4–10.2)
CHLORIDE BLD-SCNC: 93 MMOL/L (ref 98–120)
CO2: 38 MMOL/L (ref 22–31)
CREAT SERPL-MCNC: 1 MG/DL (ref 0.5–1)
GFR, ESTIMATED: 56.7
GLOBULIN: 2.6 G/DL
GLUCOSE: 148 MG/DL (ref 65–105)
HCT VFR BLD CALC: 35.8 % (ref 37–47)
HEMOGLOBIN: 10.4 G/DL (ref 12–16)
MCH RBC QN AUTO: 24.1 PG (ref 28.5–32.5)
MCHC RBC AUTO-ENTMCNC: 29.1 G/DL (ref 32–37)
MCV RBC AUTO: 82.8 FL (ref 80–94)
PDW BLD-RTO: 15.6 % (ref 8.5–15.5)
PLATELET # BLD: 270.9 THOU/MM3 (ref 130–400)
POTASSIUM SERPL-SCNC: 4.6 MMOL/L (ref 3.6–5)
RBC # BLD: 4.32 M/UL (ref 4.2–5.4)
SODIUM BLD-SCNC: 137 MMOL/L (ref 135–145)
TOTAL PROTEIN: 6.2 G/DL (ref 6.3–8.2)
WBC # BLD: 7.2 THOU/ML3 (ref 4.8–10.8)

## 2025-08-14 LAB
BACTERIA, URINE: ABNORMAL /HPF
BILIRUBIN, URINE: NEGATIVE
BLOOD, URINE: NEGATIVE
CASTS UA: ABNORMAL /LPF
CLARITY, UA: ABNORMAL
COLOR, UA: YELLOW
CRYSTALS, UA: ABNORMAL
GLUCOSE URINE: NEGATIVE MG/DL
KETONES, URINE: NEGATIVE MG/DL
LEUKOCYTE ESTERASE, URINE: ABNORMAL
NITRITE, URINE: POSITIVE
PH, URINE: 6 (ref 5–8.5)
PROTEIN UA: NEGATIVE MG/DL
RBC URINE: ABNORMAL /HPF (ref 0–2)
SPECIFIC GRAVITY UA: 1.01 E.U./DL (ref 1–1.03)
SQUAMOUS EPITHELIAL: ABNORMAL /HPF
UROBILINOGEN, URINE: 0.2 MG/DL (ref 0.2–1)
WBC URINE: ABNORMAL /HPF (ref 0–4)